# Patient Record
Sex: MALE | Race: BLACK OR AFRICAN AMERICAN | NOT HISPANIC OR LATINO | Employment: UNEMPLOYED | URBAN - METROPOLITAN AREA
[De-identification: names, ages, dates, MRNs, and addresses within clinical notes are randomized per-mention and may not be internally consistent; named-entity substitution may affect disease eponyms.]

---

## 2020-01-23 LAB — HEP C VIRUS AB: NEGATIVE

## 2020-02-03 ENCOUNTER — OFFICE VISIT (OUTPATIENT)
Dept: FAMILY MEDICINE | Facility: CLINIC | Age: 50
End: 2020-02-03
Payer: MEDICAID

## 2020-02-03 VITALS
TEMPERATURE: 98 F | DIASTOLIC BLOOD PRESSURE: 70 MMHG | HEART RATE: 72 BPM | WEIGHT: 242.19 LBS | HEIGHT: 71 IN | SYSTOLIC BLOOD PRESSURE: 118 MMHG | OXYGEN SATURATION: 99 % | BODY MASS INDEX: 33.91 KG/M2 | RESPIRATION RATE: 14 BRPM

## 2020-02-03 DIAGNOSIS — M51.35 DDD (DEGENERATIVE DISC DISEASE), THORACOLUMBAR: ICD-10-CM

## 2020-02-03 DIAGNOSIS — Z86.19 HISTORY OF HEPATITIS C: ICD-10-CM

## 2020-02-03 DIAGNOSIS — Z76.89 ENCOUNTER TO ESTABLISH CARE: Primary | ICD-10-CM

## 2020-02-03 DIAGNOSIS — I10 HYPERTENSION, UNSPECIFIED TYPE: ICD-10-CM

## 2020-02-03 PROCEDURE — 99203 PR OFFICE/OUTPT VISIT, NEW, LEVL III, 30-44 MIN: ICD-10-PCS | Mod: S$GLB,,, | Performed by: FAMILY MEDICINE

## 2020-02-03 PROCEDURE — 99203 OFFICE O/P NEW LOW 30 MIN: CPT | Mod: S$GLB,,, | Performed by: FAMILY MEDICINE

## 2020-02-03 RX ORDER — METOPROLOL SUCCINATE 25 MG/1
25 TABLET, EXTENDED RELEASE ORAL DAILY
COMMUNITY
End: 2020-02-04 | Stop reason: SDUPTHER

## 2020-02-03 RX ORDER — ROSUVASTATIN CALCIUM 40 MG/1
10 TABLET, COATED ORAL NIGHTLY
COMMUNITY
End: 2020-02-04 | Stop reason: SDUPTHER

## 2020-02-03 RX ORDER — CLONAZEPAM 1 MG/1
1 TABLET ORAL DAILY
COMMUNITY
End: 2020-05-06 | Stop reason: SDUPTHER

## 2020-02-03 RX ORDER — CARISOPRODOL 350 MG/1
350 TABLET ORAL 4 TIMES DAILY PRN
COMMUNITY
End: 2020-03-02 | Stop reason: SDUPTHER

## 2020-02-03 RX ORDER — BENZONATATE 100 MG/1
100 CAPSULE ORAL 3 TIMES DAILY PRN
COMMUNITY
End: 2020-03-02

## 2020-02-03 RX ORDER — ISOSORBIDE MONONITRATE 30 MG/1
30 TABLET, EXTENDED RELEASE ORAL DAILY
COMMUNITY
End: 2020-02-04 | Stop reason: SDUPTHER

## 2020-02-03 RX ORDER — PANTOPRAZOLE SODIUM 40 MG/1
40 TABLET, DELAYED RELEASE ORAL DAILY
COMMUNITY
End: 2021-10-13 | Stop reason: SDUPTHER

## 2020-02-03 RX ORDER — AMLODIPINE BESYLATE 5 MG/1
5 TABLET ORAL DAILY
COMMUNITY
End: 2020-02-04 | Stop reason: SDUPTHER

## 2020-02-04 RX ORDER — METOPROLOL SUCCINATE 25 MG/1
25 TABLET, EXTENDED RELEASE ORAL DAILY
Qty: 90 TABLET | Refills: 3 | Status: SHIPPED | OUTPATIENT
Start: 2020-02-04 | End: 2021-01-14 | Stop reason: SDUPTHER

## 2020-02-04 RX ORDER — ROSUVASTATIN CALCIUM 10 MG/1
10 TABLET, COATED ORAL NIGHTLY
Qty: 90 TABLET | Refills: 3 | Status: SHIPPED | OUTPATIENT
Start: 2020-02-04 | End: 2021-01-14 | Stop reason: SDUPTHER

## 2020-02-04 RX ORDER — AMLODIPINE BESYLATE 5 MG/1
5 TABLET ORAL DAILY
Qty: 90 TABLET | Refills: 3 | Status: SHIPPED | OUTPATIENT
Start: 2020-02-04 | End: 2021-01-14 | Stop reason: SDUPTHER

## 2020-02-04 RX ORDER — ISOSORBIDE MONONITRATE 30 MG/1
30 TABLET, EXTENDED RELEASE ORAL DAILY
Qty: 90 TABLET | Refills: 3 | Status: SHIPPED | OUTPATIENT
Start: 2020-02-04 | End: 2021-01-14 | Stop reason: SDUPTHER

## 2020-02-04 NOTE — PROGRESS NOTES
[facility] - Clinic Note    Subjective      Mr. Bales is a 49 y.o. male who presents to clinic for [facility]    Patient presents to [facility].  He has a history of hypertension is currently taking amlodipine, Imdur, metoprolol.  He is followed by Dr. Mendez, cardiologist, because he had intermittent chest pain. He had stress test which was normal.  Denies any history of coronary artery disease or MIs.  He also has a history of hyperlipidemia and is on Crestor.  He is followed by [facility] for anxiety and is currently taking Klonopin.  He has a history of degenerative disc disease and is currently on Soma daily which helps his pain. He reports intermittent numbness and tingling in his upper extremities  He also has a history of hepatitis C that was cured 1 year ago at [facility] in [place].  He goes for 6 month checkups.  He was previously being followed by Dr. Fairchild.    [facility] Vincent has a past medical history of DDD (degenerative disc disease), thoracolumbar, Hyperlipidemia, and Hypertension.   PSX Vincent has no past surgical history on file.    Vincent's family history includes Diabetes in his maternal grandmother; Hypertension in his mother.    Vincent reports that he has never smoked. He has never used smokeless tobacco. He reports that he does not drink alcohol or use drugs.   [facility] Vincent has No Known Allergies.   [facility] Vincent has a current medication list which includes the following prescription(s): amlodipine, benzonatate, carisoprodol, clonazepam, isosorbide mononitrate, metoprolol succinate, pantoprazole, and rosuvastatin.     Review of Systems   Constitutional: Negative for chills and fever.   HENT: Negative for congestion and rhinorrhea.    Eyes: Negative for visual disturbance.   Respiratory: Negative for cough and shortness of breath.    Cardiovascular: Negative for chest pain.   Gastrointestinal: Negative for abdominal pain, constipation, diarrhea, nausea and vomiting.  "  Genitourinary: Negative for dysuria.   Musculoskeletal: Positive for back pain. Negative for myalgias.   Skin: Negative for rash.   Neurological: Negative for weakness and headaches.     Objective     /70 (BP Location: Right arm, Patient Position: Sitting, BP Method: X-Large (Automatic))   Pulse 72   Temp 98.4 °F (36.9 °C) (Oral)   Resp 14   Ht 5' 11" (1.803 m)   Wt 109.9 kg (242 lb 3.2 oz)   SpO2 99%   BMI 33.78 kg/m²     Physical Exam   Constitutional: He appears well-developed and well-nourished. No distress.   HENT:   Right Ear: External ear normal.   Left Ear: External ear normal.   Eyes: Right eye exhibits no discharge. Left eye exhibits no discharge.   Cardiovascular: Normal rate, regular rhythm and normal heart sounds.   Pulmonary/Chest: Effort normal and breath sounds normal. He has no wheezes. He has no rales.   Neurological: He is alert.   Skin: Skin is warm and dry.   Psychiatric: He has a normal mood and affect.   Nursing note and vitals reviewed.     Assessment/Plan     1. Encounter to establish care     2. Hypertension, unspecified type  amLODIPine (NORVASC) 5 MG tablet    isosorbide mononitrate (IMDUR) 30 MG 24 hr tablet    metoprolol succinate (TOPROL-XL) 25 MG 24 hr tablet    rosuvastatin (CRESTOR) 10 MG tablet   3. DDD (degenerative disc disease), thoracolumbar     4. History of hepatitis C       Patient doing well today.  Blood pressure well controlled.  Will continue current medications for now.  Will obtain records from previous physicians for review.    Follow up in about 4 weeks (around 3/2/2020) for Follow up.    Future Appointments   Date Time Provider Department Center   3/2/2020  1:40 PM Kin Domínguez MD [facility]         Kin Domínguez MD  Family Medicine  [facility]          "

## 2020-02-06 DIAGNOSIS — Z12.11 COLON CANCER SCREENING: ICD-10-CM

## 2020-02-17 ENCOUNTER — PATIENT OUTREACH (OUTPATIENT)
Dept: ADMINISTRATIVE | Facility: HOSPITAL | Age: 50
End: 2020-02-17

## 2020-02-17 NOTE — LETTER
"February 17, 2020    Vincent Bales  [address] [place] [address]             [facility]  [address]  [facility] [address]  Phone: [phone] Dear John [facility] is committed to your overall health and would like to ensure that you are up to date on your recommended test and/or procedures.   Kin Domínguez MD  has found that your chart shows you may be due for the following:    Health Maintenance Due   Topic Date Due    Lipid Panel  1970    HIV Screening  12/02/1983    TETANUS VACCINE  12/02/1986    Shingles Vaccine (1 of 2) 12/02/2018    Colonoscopy  12/02/2018    Influenza Vaccine (1) 09/01/2019   If you haven't signed up for a colorectal screening please accept this invitation to be screened.    According to the American Cancer Society, colon cancer is the third most common cancer for people in the [place].  A simple screening test "Fit Kit" - done in your own home - can help find colon cancer at an early stage when it can be treated, even before any signs or symptoms develop. THIS IS A YEARLY TEST.    Testing for blood in your stool (feces or bowel movement) is the first step. If you have an upcoming visit with your Primary Care Physician you can  a Fit Kit during your visit or you can  a Fit Kit at your Primary Care Clinic prior to your visit.    The Fit Kit includes:    · Instructions on how to perform the test  · (1) Sheet of tissue paper  · (1) Small Absorption pad  · (1) Bottle to hold the sample and a small probe to help you take the sample  · (1) Small plastic bio-hazard bag  · (1) Postage-paid return envelope    Please do your test (the instructions will tell you how) and then return your sample in the postage-paid return envelope within 24 hours of collection.     If your test results are negative, you won't need testing again for another year.  If results show you need more testing, we will call you with next steps.    Regular " "colorectal cancer screening is one of the most powerful weapons for preventing colon cancer.  The website https://www.cancer.org/cancer/colon-rectal-cancer.html can answer many of your questions about this cancer and its prevention.  Just search for "colorectal cancer".  If you have any questions please call [facility] TOUCH [phone] for assistance.  If you have had any of the above done at another facility, please let us know so that we may obtain copies from that facility.  If you have a copy of these records, please provide a copy for us to scan into your chart.  You are welcome to request that the report be faxed to us at  ([phone]).     Otherwise, please schedule these appointments at your earliest convenience by calling [phone] or going to [email or web address].    If you have an upcoming scheduled appointment for the item above, please disregard this letter.    Sincerely,  [facility]  MD Patricia Johnson L.P.N. Clinical Care Coordinator  [address] [place] [address], [place] [address] [phone]         "

## 2020-03-02 ENCOUNTER — LAB VISIT (OUTPATIENT)
Dept: LAB | Facility: HOSPITAL | Age: 50
End: 2020-03-02
Attending: FAMILY MEDICINE
Payer: MEDICAID

## 2020-03-02 ENCOUNTER — OFFICE VISIT (OUTPATIENT)
Dept: FAMILY MEDICINE | Facility: CLINIC | Age: 50
End: 2020-03-02
Payer: MEDICAID

## 2020-03-02 VITALS
BODY MASS INDEX: 33.6 KG/M2 | RESPIRATION RATE: 14 BRPM | DIASTOLIC BLOOD PRESSURE: 72 MMHG | HEIGHT: 71 IN | HEART RATE: 77 BPM | TEMPERATURE: 98 F | OXYGEN SATURATION: 96 % | SYSTOLIC BLOOD PRESSURE: 114 MMHG | WEIGHT: 240 LBS

## 2020-03-02 DIAGNOSIS — Z13.220 SCREENING FOR LIPID DISORDERS: ICD-10-CM

## 2020-03-02 DIAGNOSIS — Z11.4 SCREENING FOR HIV (HUMAN IMMUNODEFICIENCY VIRUS): ICD-10-CM

## 2020-03-02 DIAGNOSIS — Z23 NEED FOR TDAP VACCINATION: ICD-10-CM

## 2020-03-02 DIAGNOSIS — M51.35 DDD (DEGENERATIVE DISC DISEASE), THORACOLUMBAR: Primary | ICD-10-CM

## 2020-03-02 DIAGNOSIS — R05.9 COUGH: ICD-10-CM

## 2020-03-02 LAB
CHOLEST SERPL-MCNC: 193 MG/DL (ref 120–199)
CHOLEST/HDLC SERPL: 3.9 {RATIO} (ref 2–5)
HDLC SERPL-MCNC: 49 MG/DL (ref 40–75)
HDLC SERPL: 25.4 % (ref 20–50)
LDLC SERPL CALC-MCNC: 120.6 MG/DL (ref 63–159)
NONHDLC SERPL-MCNC: 144 MG/DL
TRIGL SERPL-MCNC: 117 MG/DL (ref 30–150)

## 2020-03-02 PROCEDURE — 80061 LIPID PANEL: CPT

## 2020-03-02 PROCEDURE — 90471 IMMUNIZATION ADMIN: CPT | Mod: S$GLB,,, | Performed by: FAMILY MEDICINE

## 2020-03-02 PROCEDURE — 99214 PR OFFICE/OUTPT VISIT, EST, LEVL IV, 30-39 MIN: ICD-10-PCS | Mod: 25,S$GLB,, | Performed by: FAMILY MEDICINE

## 2020-03-02 PROCEDURE — 99214 OFFICE O/P EST MOD 30 MIN: CPT | Mod: 25,S$GLB,, | Performed by: FAMILY MEDICINE

## 2020-03-02 PROCEDURE — 36415 COLL VENOUS BLD VENIPUNCTURE: CPT

## 2020-03-02 PROCEDURE — 90471 TDAP VACCINE GREATER THAN OR EQUAL TO 7YO IM: ICD-10-PCS | Mod: S$GLB,,, | Performed by: FAMILY MEDICINE

## 2020-03-02 PROCEDURE — 86703 HIV-1/HIV-2 1 RESULT ANTBDY: CPT

## 2020-03-02 PROCEDURE — 90715 TDAP VACCINE GREATER THAN OR EQUAL TO 7YO IM: ICD-10-PCS | Mod: S$GLB,,, | Performed by: FAMILY MEDICINE

## 2020-03-02 PROCEDURE — 90715 TDAP VACCINE 7 YRS/> IM: CPT | Mod: S$GLB,,, | Performed by: FAMILY MEDICINE

## 2020-03-02 RX ORDER — QUETIAPINE FUMARATE 200 MG/1
TABLET, FILM COATED ORAL
COMMUNITY
Start: 2020-02-04 | End: 2021-09-07 | Stop reason: SDUPTHER

## 2020-03-02 RX ORDER — CARISOPRODOL 350 MG/1
350 TABLET ORAL 2 TIMES DAILY PRN
Qty: 60 TABLET | Refills: 0 | Status: SHIPPED | OUTPATIENT
Start: 2020-03-02 | End: 2020-04-24 | Stop reason: SDUPTHER

## 2020-03-02 RX ORDER — FLUTICASONE PROPIONATE 50 MCG
1 SPRAY, SUSPENSION (ML) NASAL 2 TIMES DAILY
Qty: 16 G | Refills: 0 | Status: SHIPPED | OUTPATIENT
Start: 2020-03-02 | End: 2020-03-27

## 2020-03-02 RX ORDER — BENZONATATE 100 MG/1
200 CAPSULE ORAL 3 TIMES DAILY PRN
Qty: 30 CAPSULE | Refills: 0 | Status: SHIPPED | OUTPATIENT
Start: 2020-03-02 | End: 2020-03-12

## 2020-03-03 LAB — HIV 1+2 AB+HIV1 P24 AG SERPL QL IA: NEGATIVE

## 2020-03-04 NOTE — PROGRESS NOTES
"[facility] - Clinic Note    Subjective      Mr. Bales is a 49 y.o. male who presents to clinic for Follow-up    Patient reports that since our last visit he has been doing well.  His blood pressure has been well controlled.  He is taking Klonopin for anxiety.  He needs a refill on his Soma which helps him with his degenerative disc disease pain. In the future he may be interested in pain management.  Patient also reports a cough that is minimally productive over the last few days.    PM[facility] Vincent has a past medical history of DDD (degenerative disc disease), thoracolumbar, Hyperlipidemia, and Hypertension.   PSXH Vincent has no past surgical history on file.    Vincent's family history includes Diabetes in his maternal grandmother; Hypertension in his mother.   [facility] Vincent reports that he has never smoked. He has never used smokeless tobacco. He reports that he does not drink alcohol or use drugs.   ALG Vincent has No Known Allergies.   ALBERTINA Kaur has a current medication list which includes the following prescription(s): amlodipine, carisoprodol, clonazepam, isosorbide mononitrate, metoprolol succinate, pantoprazole, quetiapine, rosuvastatin, benzonatate, and fluticasone propionate.     Review of Systems   Constitutional: Negative for chills and fever.   Eyes: Negative for visual disturbance.   Cardiovascular: Negative for chest pain.   Musculoskeletal: Positive for back pain and neck pain.   Neurological: Negative for headaches.     Objective     /72 (BP Location: Right arm, Patient Position: Sitting, BP Method: Large (Automatic))   Pulse 77   Temp 98.1 °F (36.7 °C) (Oral)   Resp 14   Ht 5' 11" (1.803 m)   Wt 108.9 kg (240 lb)   SpO2 96%   BMI 33.47 kg/m²     Physical Exam   Constitutional: He appears well-developed and well-nourished. No distress.   HENT:   Right Ear: External ear normal.   Left Ear: External ear normal.   Eyes: Right eye exhibits no discharge. Left eye exhibits no discharge. "   Cardiovascular: Normal rate, regular rhythm and normal heart sounds.   Pulmonary/Chest: Effort normal and breath sounds normal. He has no wheezes. He has no rales.   Neurological: He is alert.   Skin: Skin is warm and dry.   Psychiatric: He has a normal mood and affect.   Nursing note and vitals reviewed.     Assessment/Plan     1. DDD (degenerative disc disease), thoracolumbar  carisoprodoL (SOMA) 350 MG tablet   2. Cough  fluticasone propionate (FLONASE) 50 mcg/actuation nasal spray    benzonatate (TESSALON) 100 MG capsule   3. Screening for HIV (human immunodeficiency virus)  HIV 1/2 Ag/Ab (4th Gen)   4. Screening for lipid disorders  Lipid panel   5. Need for Tdap vaccination  (In Office Administered) Tdap Vaccine     Will continue Soma as it is improving patient's quality of life.   reviewed.  No red flags.  Given cough will prescribed Flonase and Tessalon to use as needed.  Screening lab work as above.    Follow up in about 2 months (around 5/2/2020) for follow up.    Future Appointments   Date Time Provider Department Center   5/5/2020  1:20 PM Kin Domínguez MD [facility]         Kin Domínguez MD  Family Medicine  [facility]

## 2020-03-16 LAB
CHOLEST SERPL-MSCNC: 205 MG/DL (ref 0–200)
HDLC SERPL-MCNC: 48 MG/DL
LDLC SERPL CALC-MCNC: 132 MG/DL
TRIGL SERPL-MCNC: 103 MG/DL

## 2020-03-20 ENCOUNTER — TELEPHONE (OUTPATIENT)
Dept: FAMILY MEDICINE | Facility: CLINIC | Age: 50
End: 2020-03-20

## 2020-03-20 NOTE — TELEPHONE ENCOUNTER
03/24/2020  5:28PM--- Continuity of care?  Please advise. Thank you    03/20/2020 4:55PM unable to reach patient or leave message X1      ----- Message from Cynthia Monreal sent at 3/20/2020  9:24 AM CDT -----  Contact: Miranda from [employer]  Type: Needs Medical Advice    Who Called:  Medicaid Rep  Best Call Back Number: [phone] Ext [phone]  Additional Information: Wants to know if the DR will do a continuity of care for the patient so he can continue the gastro and the cardi Dr please call her to advise asap please.

## 2020-03-25 ENCOUNTER — TELEPHONE (OUTPATIENT)
Dept: SURGERY | Facility: CLINIC | Age: 50
End: 2020-03-25

## 2020-03-25 NOTE — TELEPHONE ENCOUNTER
Attempted to  that left message.  Her voicemail states that she is out of the office at this time and gave a different number and extension to contact. Number given- [phone], ext# [phone]. Contacted this number and extension and left voicemail to contact office for clarification as to what is needed for patient.

## 2020-03-25 NOTE — TELEPHONE ENCOUNTER
LVM for patient to return call concerning message. MD would like to know why patient is requesting Rx.

## 2020-03-25 NOTE — TELEPHONE ENCOUNTER
----- Message from Faraz Robertson sent at 3/25/2020  1:31 PM CDT -----  Type: Needs Medical Advice    Who Called:  Patient    Pharmacy name and phone #:    [facility], [place] - [address]  [address]  [address]  [facility] [address]  Phone: [phone] Fax: [phone]      Best Call Back Number: [phone]  Additional Information: Patient states that he would like a Z pack called in.

## 2020-03-26 ENCOUNTER — TELEPHONE (OUTPATIENT)
Dept: FAMILY MEDICINE | Facility: CLINIC | Age: 50
End: 2020-03-26

## 2020-03-26 DIAGNOSIS — R05.9 COUGH: ICD-10-CM

## 2020-03-26 RX ORDER — AZITHROMYCIN 250 MG/1
TABLET, FILM COATED ORAL
Qty: 6 TABLET | Refills: 0 | Status: SHIPPED | OUTPATIENT
Start: 2020-03-26 | End: 2020-03-31

## 2020-03-26 NOTE — TELEPHONE ENCOUNTER
----- Message from Sheri Melvin LPN sent at 3/26/2020  2:32 PM CDT -----  Contact: pt  Dr Domínguez    I spoke to patient and he stated that he is having sinus drainage that is getting in his chest and causing some congestion. Patient stated that he has not been around anyone, denies fever or SOB. Patient is requesting Rx z-pack. Please advise,    Thanks Dee    ----- Message -----  From: Rosalva Bourgeois  Sent: 3/26/2020   1:01 PM CDT  To: Catrachita [facility] Staff    Type:  Patient Returning Call    Who Called:  pt  Does the patient know what this is regarding?:  Pt needs office to give him a call as soon as possible regarding ZPAck. Pt is having inflammation builfing up in lungs  Best Call Back Number:    Additional Information:  Thank  you

## 2020-03-27 RX ORDER — FLUTICASONE PROPIONATE 50 MCG
SPRAY, SUSPENSION (ML) NASAL
Qty: 16 G | Refills: 0 | Status: SHIPPED | OUTPATIENT
Start: 2020-03-27 | End: 2020-05-06 | Stop reason: SDUPTHER

## 2020-04-02 DIAGNOSIS — R05.9 COUGH: Primary | ICD-10-CM

## 2020-04-02 RX ORDER — BENZONATATE 100 MG/1
200 CAPSULE ORAL 3 TIMES DAILY PRN
Qty: 270 CAPSULE | Refills: 0 | Status: SHIPPED | OUTPATIENT
Start: 2020-04-02 | End: 2020-07-02 | Stop reason: SDUPTHER

## 2020-04-02 NOTE — TELEPHONE ENCOUNTER
----- Message from Vladimir Mireles sent at 4/2/2020  3:05 PM CDT -----  Type: Needs Medical Advice    Who Called:  Self   Symptoms (please be specific): NA  How long has patient had these symptoms:    Pharmacy name and phone #:  [facility]   Best Call Back Number: [phone]  Additional Information: Patient called to give the name of the cough medication -Benzonate Requesting 90 day supply.

## 2020-04-17 ENCOUNTER — TELEPHONE (OUTPATIENT)
Dept: FAMILY MEDICINE | Facility: CLINIC | Age: 50
End: 2020-04-17

## 2020-04-17 NOTE — TELEPHONE ENCOUNTER
----- Message from Faraz Robertson sent at 4/17/2020 10:46 AM CDT -----  Type:  RX Refill Request    Who Called:  Patient  Refill or New Rx:  Refill  RX Name and Strength:  carisoprodoL (SOMA) 350 MG tablet  How is the patient currently taking it? (ex. 1XDay):  Take 1 tablet (350 mg total) by mouth 2 (two) times daily as needed for Muscle spasms.  Is this a 30 day or 90 day RX:  30  Preferred Pharmacy with phone number:    [facility], [place] - [address]  [address]  [facility] [address]  [place] [address]  Phone: [phone] Fax: [phone]      Local or Mail Order:  Local  Ordering Provider:  Catrachita  Best Call Back Number:  [phone]  Additional Information:

## 2020-04-24 DIAGNOSIS — M51.35 DDD (DEGENERATIVE DISC DISEASE), THORACOLUMBAR: ICD-10-CM

## 2020-04-24 RX ORDER — CARISOPRODOL 350 MG/1
350 TABLET ORAL 2 TIMES DAILY PRN
Qty: 60 TABLET | Refills: 0 | Status: SHIPPED | OUTPATIENT
Start: 2020-04-24 | End: 2020-07-02 | Stop reason: SDUPTHER

## 2020-04-24 RX ORDER — CLONAZEPAM 1 MG/1
1 TABLET ORAL DAILY
Status: CANCELLED | OUTPATIENT
Start: 2020-04-24

## 2020-04-24 NOTE — TELEPHONE ENCOUNTER
----- Message from Faraz Robertson sent at 4/24/2020  9:32 AM CDT -----  Type: Needs Medical Advice  Who Called:  Patient    Pharmacy name and phone #:    [facility] [place], [place] - [address]  [address]  [address]  [facility] [address]  Phone: [phone] Fax: [phone]      Best Call Back Number: [phone]  Additional Information: Patient states that he had sent a request about a week ago for his refills with no response:  carisoprodoL (SOMA) 350 MG tablet   clonazePAM (KLONOPIN) 1 MG tablet     Please call to advise

## 2020-04-24 NOTE — TELEPHONE ENCOUNTER
I sent in the soma; the klonopin was being filled by another doctor - the next refill is not due until 5/2/20. If he is still seeing that provider then he should request refills from there. If not let me know.

## 2020-05-05 ENCOUNTER — PATIENT OUTREACH (OUTPATIENT)
Dept: ADMINISTRATIVE | Facility: HOSPITAL | Age: 50
End: 2020-05-05

## 2020-05-05 ENCOUNTER — PATIENT MESSAGE (OUTPATIENT)
Dept: ADMINISTRATIVE | Facility: HOSPITAL | Age: 50
End: 2020-05-05

## 2020-05-05 ENCOUNTER — TELEPHONE (OUTPATIENT)
Dept: FAMILY MEDICINE | Facility: CLINIC | Age: 50
End: 2020-05-05

## 2020-05-05 RX ORDER — CLONAZEPAM 1 MG/1
1 TABLET ORAL DAILY
OUTPATIENT
Start: 2020-05-05

## 2020-05-05 NOTE — TELEPHONE ENCOUNTER
Called patient, no answer. Left voicemail. Patient needs to be seen if he wants a klonopin script. Patient missed appointment. Needs to reschedule.

## 2020-05-05 NOTE — PROGRESS NOTES
Pre-visit Chart review notification  05/05/20  EFAX SENT TO [facility] FOR COLONOSCOPY RESULTS

## 2020-05-05 NOTE — LETTER
FAX      AUTHORIZATION FOR RELEASE OF   CONFIDENTIAL INFORMATION            [facility]      We are seeing Vincent Bales, date of birth 1970, in the clinic at [facility]. Kin Domínguez MD is the patient's PCP. Vincent Bales has an outstanding lab/procedure at the time we reviewed her chart. In order to help keep her health information updated, she has authorized us to request the following medical record(s):        (  )  MAMMOGRAM                                      ( X )  COLONOSCOPY      (  )  PAP SMEAR                                          (  )  MOST RECENT LAB RESULTS     (  )  DEXA SCAN                                          (  )  DIABETIC EYE EXAM            (  )  DIABETIC FOOT EXAM                        (  )  MOST RECENT A1c, LIPID, &          URINE MICRO-ALBUMIN     (  )  OUTSIDE IMMUNIZATIONS                 (  )  _______________        Please fax records to [facility]  [phone]     If you have any questions, please contact Patricia at [phone].      Patricia Brewer L.P.N. Clinical Care Coordinator  [address] [place] [address] [phone]

## 2020-05-05 NOTE — TELEPHONE ENCOUNTER
----- Message from Magalys Bradley sent at 5/5/2020  3:48 PM CDT -----  Contact: Patient  Type: Needs Medical Advice    Who Called:  Patient    Best Call Back Number: [phone]    Additional Information: pt has questions regarding medication that was supposed to be sent in today? Requesting a call back

## 2020-05-06 ENCOUNTER — DOCUMENTATION ONLY (OUTPATIENT)
Dept: FAMILY MEDICINE | Facility: CLINIC | Age: 50
End: 2020-05-06

## 2020-05-06 ENCOUNTER — OFFICE VISIT (OUTPATIENT)
Dept: FAMILY MEDICINE | Facility: CLINIC | Age: 50
End: 2020-05-06
Payer: MEDICAID

## 2020-05-06 DIAGNOSIS — R05.9 COUGH: ICD-10-CM

## 2020-05-06 DIAGNOSIS — F41.9 ANXIETY: Primary | ICD-10-CM

## 2020-05-06 PROCEDURE — 99442 PR PHYSICIAN TELEPHONE EVALUATION 11-20 MIN: CPT | Mod: GT,,, | Performed by: FAMILY MEDICINE

## 2020-05-06 PROCEDURE — 99442 PR PHYSICIAN TELEPHONE EVALUATION 11-20 MIN: ICD-10-PCS | Mod: GT,,, | Performed by: FAMILY MEDICINE

## 2020-05-06 RX ORDER — FLUTICASONE PROPIONATE 50 MCG
1 SPRAY, SUSPENSION (ML) NASAL 2 TIMES DAILY
Qty: 16 G | Refills: 1 | Status: SHIPPED | OUTPATIENT
Start: 2020-05-06 | End: 2020-10-07 | Stop reason: SDUPTHER

## 2020-05-06 RX ORDER — CLONAZEPAM 1 MG/1
1 TABLET ORAL DAILY
Qty: 30 TABLET | Refills: 1 | Status: SHIPPED | OUTPATIENT
Start: 2020-05-06 | End: 2020-07-02 | Stop reason: SDUPTHER

## 2020-05-06 NOTE — PROGRESS NOTES
[facility] - Telephone Note    The patient location is: home  The chief complaint leading to consultation is:  Follow-up  Visit type: Virtual visit with synchronous audio  Total time spent with patient:  15 min  Each patient to whom he or she provides medical services by telemedicine is:  (1) informed of the relationship between the physician and patient and the respective role of any other health care provider with respect to management of the patient; and (2) notified that he or she may decline to receive medical services by telemedicine and may withdraw from such care at any time.    Notes: Phlegm in the cough for the last 3 days. No fever.  Some postnasal drip.  Has not been using Flonase like suggested.  Patient also wants me to take over his mental health treatment.  Was previously taking Klonopin 1 mg daily which has been helping with his anxiety and helping prevent panic attacks.    Plan:  Will continue Klonopin 1 mg daily.   reviewed.  No red flags.  Lab work next visit as well as urine drug screen.    Follow up: 2 months.      Kin Domínguez MD  Family Medicine  [facility]              
Abdominal Pain, N/V/D

## 2020-05-06 NOTE — PROGRESS NOTES
"Your fax has been successfully sent to [phone] at [phone].  ------------------------------------------------------------  From: [id number]  ------------------------------------------------------------  5/6/2020 4:06:52 PM Transmission Record          Sent to [phone] with remote ID "[facility]"          Result: (0/339;0/0) Success          Page record: 1 - 5          Elapsed time: 02:49 on channel 35    "

## 2020-05-14 ENCOUNTER — PATIENT OUTREACH (OUTPATIENT)
Dept: ADMINISTRATIVE | Facility: HOSPITAL | Age: 50
End: 2020-05-14

## 2020-05-20 ENCOUNTER — PATIENT MESSAGE (OUTPATIENT)
Dept: ADMINISTRATIVE | Facility: HOSPITAL | Age: 50
End: 2020-05-20

## 2020-06-18 DIAGNOSIS — I10 HYPERTENSION, UNSPECIFIED TYPE: ICD-10-CM

## 2020-07-02 DIAGNOSIS — R05.9 COUGH: ICD-10-CM

## 2020-07-02 DIAGNOSIS — F41.9 ANXIETY: ICD-10-CM

## 2020-07-02 DIAGNOSIS — M51.35 DDD (DEGENERATIVE DISC DISEASE), THORACOLUMBAR: ICD-10-CM

## 2020-07-02 RX ORDER — CLONAZEPAM 1 MG/1
1 TABLET ORAL DAILY
Qty: 30 TABLET | Refills: 0 | Status: SHIPPED | OUTPATIENT
Start: 2020-07-02 | End: 2020-07-31 | Stop reason: SDUPTHER

## 2020-07-02 RX ORDER — BENZONATATE 100 MG/1
200 CAPSULE ORAL 3 TIMES DAILY PRN
Qty: 270 CAPSULE | Refills: 0 | Status: SHIPPED | OUTPATIENT
Start: 2020-07-02 | End: 2020-09-01 | Stop reason: SDUPTHER

## 2020-07-02 RX ORDER — CARISOPRODOL 350 MG/1
350 TABLET ORAL 2 TIMES DAILY PRN
Qty: 60 TABLET | Refills: 0 | Status: SHIPPED | OUTPATIENT
Start: 2020-07-02 | End: 2020-08-06 | Stop reason: SDUPTHER

## 2020-07-02 NOTE — TELEPHONE ENCOUNTER
----- Message from Rg Wilson sent at 7/2/2020  9:19 AM CDT -----  Regarding: pt  Type:  RX Refill Request    Who Called:  pt  Refill or New Rx:  refill  RX Name and Strength:    1. carisoprodoL (SOMA) 350 MG tablet 60 tablet 0 4/24/2020        Sig - Route: Take 1 tablet (350 mg total) by mouth 2 (two) times daily as needed for Muscle spasms. - Oral     2. benzonatate (TESSALON) 100 MG capsule 270 capsule 0 4/2/2020 7/1/2020      Sig - Route: Take 2 capsules (200 mg total) by mouth 3 (three) times daily as needed for Cough. - Oral     3. clonazePAM (KLONOPIN) 1 MG tablet 30 tablet 1 5/6/2020        Sig - Route: Take 1 tablet (1 mg total) by mouth once daily. - Oral     How is the patient currently taking it? (ex. 1XDay):  see above   Is this a 30 day or 90 day RX: 30/90  Preferred Pharmacy with phone number:    [facility], [place] - [address]  [address] [address]  Phone: [phone] Fax: [phone]    Local or Mail Order:  local  Ordering Provider:  florence  Best Call Back Number:  [phone]  Additional Information:

## 2020-07-06 ENCOUNTER — OFFICE VISIT (OUTPATIENT)
Dept: FAMILY MEDICINE | Facility: CLINIC | Age: 50
End: 2020-07-06
Payer: MEDICAID

## 2020-07-06 VITALS
DIASTOLIC BLOOD PRESSURE: 75 MMHG | OXYGEN SATURATION: 97 % | BODY MASS INDEX: 31.7 KG/M2 | WEIGHT: 239.19 LBS | HEART RATE: 91 BPM | HEIGHT: 73 IN | SYSTOLIC BLOOD PRESSURE: 125 MMHG | TEMPERATURE: 98 F | RESPIRATION RATE: 15 BRPM

## 2020-07-06 DIAGNOSIS — I10 HYPERTENSION, UNSPECIFIED TYPE: Primary | ICD-10-CM

## 2020-07-06 DIAGNOSIS — F41.9 ANXIETY: ICD-10-CM

## 2020-07-06 DIAGNOSIS — M51.35 DDD (DEGENERATIVE DISC DISEASE), THORACOLUMBAR: ICD-10-CM

## 2020-07-06 PROCEDURE — 99214 PR OFFICE/OUTPT VISIT, EST, LEVL IV, 30-39 MIN: ICD-10-PCS | Mod: S$GLB,,, | Performed by: FAMILY MEDICINE

## 2020-07-06 PROCEDURE — 99214 OFFICE O/P EST MOD 30 MIN: CPT | Mod: S$GLB,,, | Performed by: FAMILY MEDICINE

## 2020-07-06 PROCEDURE — 80307 DRUG TEST PRSMV CHEM ANLYZR: CPT

## 2020-07-06 NOTE — PROGRESS NOTES
"[facility] - Clinic Note    Subjective      Mr. Bales is a 49 y.o. male who presents to clinic for Follow-up (requesting TETANUS)    Patient reports that he has been doing well.  The medication for anxiety Klonopin has been working well for his symptoms.  He has a history of anxiety and panic attacks.  He also has a history of degenerative disc disease and takes Soma which controls his symptoms.  Blood pressure has been well controlled.  Denies any chest pain, blurry vision, headaches.  He is inquiring about getting another tetanus shot but he had a tetanus shot 3 months ago.    [facility] Vincent has a past medical history of DDD (degenerative disc disease), thoracolumbar, Hyperlipidemia, and Hypertension.   PSXH Vincent has no past surgical history on file.    Vincent's family history includes Diabetes in his maternal grandmother; Hypertension in his mother.    Vincent reports that he has never smoked. He has never used smokeless tobacco. He reports that he does not drink alcohol or use drugs.   ALG Vincent has No Known Allergies.   MED Vincent has a current medication list which includes the following prescription(s): amlodipine, benzonatate, carisoprodol, clonazepam, fluticasone propionate, isosorbide mononitrate, metoprolol succinate, pantoprazole, quetiapine, and rosuvastatin.     Review of Systems   Constitutional: Negative for chills and fever.   Eyes: Negative for visual disturbance.   Cardiovascular: Negative for chest pain.   Neurological: Negative for headaches.     Objective     /75 (BP Location: Left arm, Patient Position: Sitting, BP Method: Large (Automatic))   Pulse 91   Temp 98.4 °F (36.9 °C) (Temporal)   Resp 15   Ht 6' 1" (1.854 m)   Wt 108.5 kg (239 lb 3.2 oz)   SpO2 97%   BMI 31.56 kg/m²     Physical Exam  Vitals signs and nursing note reviewed.   Constitutional:       General: He is not in acute distress.     Appearance: Normal appearance. He is well-developed. He is not diaphoretic.   HENT:    "   Head: Normocephalic and atraumatic.      Right Ear: External ear normal.      Left Ear: External ear normal.   Eyes:      General:         Right eye: No discharge.         Left eye: No discharge.   Cardiovascular:      Rate and Rhythm: Normal rate and regular rhythm.      Heart sounds: Normal heart sounds.   Pulmonary:      Effort: Pulmonary effort is normal.      Breath sounds: Normal breath sounds. No wheezing or rales.   Skin:     General: Skin is warm and dry.   Neurological:      Mental Status: He is alert and oriented to person, place, and time. Mental status is at baseline.   Psychiatric:         Mood and Affect: Mood normal.         Behavior: Behavior normal.         Thought Content: Thought content normal.         Judgment: Judgment normal.        Assessment/Plan     1. Hypertension, unspecified type  Comprehensive metabolic panel    CBC auto differential   2. Anxiety  Comprehensive metabolic panel    CBC auto differential    [facility] Drug Screen   3. DDD (degenerative disc disease), thoracolumbar       Will continue current medications as prescribed.  Due for lab work as above.  Due for drug screen.   reviewed.  No red flags.  Blood pressure is well controlled.    Future Appointments   Date Time Provider Department Center   7/9/2020  9:20 AM [facility]ATORY [facility]   10/7/2020  9:20 AM [facility] Dom[facility] MD [facility] Dom[facility] MD  Family Medicine  [facility]

## 2020-07-09 ENCOUNTER — LAB VISIT (OUTPATIENT)
Dept: LAB | Facility: HOSPITAL | Age: 50
End: 2020-07-09
Attending: FAMILY MEDICINE
Payer: MEDICAID

## 2020-07-09 ENCOUNTER — OFFICE VISIT (OUTPATIENT)
Dept: FAMILY MEDICINE | Facility: CLINIC | Age: 50
End: 2020-07-09
Payer: MEDICAID

## 2020-07-09 VITALS
SYSTOLIC BLOOD PRESSURE: 106 MMHG | OXYGEN SATURATION: 97 % | HEART RATE: 76 BPM | TEMPERATURE: 97 F | WEIGHT: 239 LBS | DIASTOLIC BLOOD PRESSURE: 69 MMHG | HEIGHT: 73 IN | BODY MASS INDEX: 31.68 KG/M2 | RESPIRATION RATE: 15 BRPM

## 2020-07-09 DIAGNOSIS — F41.9 ANXIETY: ICD-10-CM

## 2020-07-09 DIAGNOSIS — I10 HYPERTENSION, UNSPECIFIED TYPE: ICD-10-CM

## 2020-07-09 DIAGNOSIS — S41.111A LACERATION OF MULTIPLE SITES OF RIGHT UPPER EXTREMITY, INITIAL ENCOUNTER: Primary | ICD-10-CM

## 2020-07-09 LAB
ALBUMIN SERPL BCP-MCNC: 4.3 G/DL (ref 3.5–5.2)
ALP SERPL-CCNC: 55 U/L (ref 55–135)
ALT SERPL W/O P-5'-P-CCNC: 39 U/L (ref 10–44)
ANION GAP SERPL CALC-SCNC: 10 MMOL/L (ref 8–16)
AST SERPL-CCNC: 29 U/L (ref 10–40)
BASOPHILS # BLD AUTO: 0.04 K/UL (ref 0–0.2)
BASOPHILS NFR BLD: 0.7 % (ref 0–1.9)
BILIRUB SERPL-MCNC: 0.8 MG/DL (ref 0.1–1)
BUN SERPL-MCNC: 9 MG/DL (ref 6–20)
CALCIUM SERPL-MCNC: 9.1 MG/DL (ref 8.7–10.5)
CHLORIDE SERPL-SCNC: 103 MMOL/L (ref 95–110)
CO2 SERPL-SCNC: 24 MMOL/L (ref 23–29)
CREAT SERPL-MCNC: 0.8 MG/DL (ref 0.5–1.4)
DIFFERENTIAL METHOD: ABNORMAL
EOSINOPHIL # BLD AUTO: 0.1 K/UL (ref 0–0.5)
EOSINOPHIL NFR BLD: 1.5 % (ref 0–8)
ERYTHROCYTE [DISTWIDTH] IN BLOOD BY AUTOMATED COUNT: 13.1 % (ref 11.5–14.5)
EST. GFR  (AFRICAN AMERICAN): >60 ML/MIN/1.73 M^2
EST. GFR  (NON AFRICAN AMERICAN): >60 ML/MIN/1.73 M^2
GLUCOSE SERPL-MCNC: 108 MG/DL (ref 70–110)
HCT VFR BLD AUTO: 41.9 % (ref 40–54)
HGB BLD-MCNC: 14 G/DL (ref 14–18)
IMM GRANULOCYTES # BLD AUTO: 0.01 K/UL (ref 0–0.04)
IMM GRANULOCYTES NFR BLD AUTO: 0.2 % (ref 0–0.5)
LYMPHOCYTES # BLD AUTO: 2.6 K/UL (ref 1–4.8)
LYMPHOCYTES NFR BLD: 45.3 % (ref 18–48)
MCH RBC QN AUTO: 30.7 PG (ref 27–31)
MCHC RBC AUTO-ENTMCNC: 33.4 G/DL (ref 32–36)
MCV RBC AUTO: 92 FL (ref 82–98)
MONOCYTES # BLD AUTO: 0.5 K/UL (ref 0.3–1)
MONOCYTES NFR BLD: 8.6 % (ref 4–15)
NEUTROPHILS # BLD AUTO: 2.6 K/UL (ref 1.8–7.7)
NEUTROPHILS NFR BLD: 43.7 % (ref 38–73)
NRBC BLD-RTO: 0 /100 WBC
PLATELET # BLD AUTO: 160 K/UL (ref 150–350)
PMV BLD AUTO: 10.5 FL (ref 9.2–12.9)
POTASSIUM SERPL-SCNC: 4 MMOL/L (ref 3.5–5.1)
PROT SERPL-MCNC: 7.6 G/DL (ref 6–8.4)
RBC # BLD AUTO: 4.56 M/UL (ref 4.6–6.2)
SODIUM SERPL-SCNC: 137 MMOL/L (ref 136–145)
WBC # BLD AUTO: 5.83 K/UL (ref 3.9–12.7)

## 2020-07-09 PROCEDURE — 80053 COMPREHEN METABOLIC PANEL: CPT

## 2020-07-09 PROCEDURE — 90714 TD VACC NO PRESV 7 YRS+ IM: CPT | Mod: S$GLB,,, | Performed by: FAMILY MEDICINE

## 2020-07-09 PROCEDURE — 36415 COLL VENOUS BLD VENIPUNCTURE: CPT

## 2020-07-09 PROCEDURE — 90471 TD VACCINE GREATER THAN OR EQUAL TO 7YO WITH PRESERVATIVE IM: ICD-10-PCS | Mod: S$GLB,,, | Performed by: FAMILY MEDICINE

## 2020-07-09 PROCEDURE — 90714 TD VACCINE GREATER THAN OR EQUAL TO 7YO WITH PRESERVATIVE IM: ICD-10-PCS | Mod: S$GLB,,, | Performed by: FAMILY MEDICINE

## 2020-07-09 PROCEDURE — 90471 IMMUNIZATION ADMIN: CPT | Mod: S$GLB,,, | Performed by: FAMILY MEDICINE

## 2020-07-09 PROCEDURE — 99214 OFFICE O/P EST MOD 30 MIN: CPT | Mod: 25,S$GLB,, | Performed by: FAMILY MEDICINE

## 2020-07-09 PROCEDURE — 85025 COMPLETE CBC W/AUTO DIFF WBC: CPT

## 2020-07-09 PROCEDURE — 99214 PR OFFICE/OUTPT VISIT, EST, LEVL IV, 30-39 MIN: ICD-10-PCS | Mod: 25,S$GLB,, | Performed by: FAMILY MEDICINE

## 2020-07-09 NOTE — PROGRESS NOTES
"[facility] - Clinic Note    Subjective      Mr. Bales is a 49 y.o. male who presents to clinic for Laceration (R finger and forearm, requesting Tetanus)    Patient reports that yesterday he cut his right forearm and right index finger on a Metal sheet.  He is worried about tetanus.  He clean the wounds with hydrogen peroxide.  There are slightly painful.  There has been no drainage from these wounds.  He has not had any fever.    PMH Vincent has a past medical history of DDD (degenerative disc disease), thoracolumbar, Hyperlipidemia, and Hypertension.   PSXH Vincent has no past surgical history on file.    Vincent's family history includes Diabetes in his maternal grandmother; Hypertension in his mother.   SH Vincent reports that he has never smoked. He has never used smokeless tobacco. He reports that he does not drink alcohol or use drugs.   ALG Vincent has No Known Allergies.   ALBERTINA Kaur has a current medication list which includes the following prescription(s): amlodipine, benzonatate, carisoprodol, clonazepam, fluticasone propionate, isosorbide mononitrate, metoprolol succinate, pantoprazole, quetiapine, and rosuvastatin.     Review of Systems   Constitutional: Negative for chills and fever.   Respiratory: Negative for shortness of breath.    Cardiovascular: Negative for chest pain.   Skin: Positive for wound.     Objective     /69 (BP Location: Left arm, Patient Position: Sitting, BP Method: Large (Automatic))   Pulse 76   Temp 97 °F (36.1 °C) (Temporal)   Resp 15   Ht 6' 1" (1.854 m)   Wt 108.4 kg (239 lb)   SpO2 97%   BMI 31.53 kg/m²     Physical Exam  Vitals signs and nursing note reviewed.   Constitutional:       General: He is not in acute distress.     Appearance: Normal appearance. He is well-developed. He is not diaphoretic.   HENT:      Head: Normocephalic and atraumatic.      Right Ear: External ear normal.      Left Ear: External ear normal.   Eyes:      General:         Right eye: No " discharge.         Left eye: No discharge.   Cardiovascular:      Rate and Rhythm: Normal rate and regular rhythm.      Heart sounds: Normal heart sounds.   Pulmonary:      Effort: Pulmonary effort is normal.      Breath sounds: Normal breath sounds. No wheezing or rales.   Skin:     General: Skin is warm and dry.          Neurological:      Mental Status: He is alert and oriented to person, place, and time. Mental status is at baseline.   Psychiatric:         Mood and Affect: Mood normal.         Behavior: Behavior normal.         Thought Content: Thought content normal.         Judgment: Judgment normal.        Assessment/Plan     1. Laceration of multiple sites of right upper extremity, initial encounter  (In Office Administered) Td Vaccine     Lacerations cleaned and dressed with triple antibiotic ointment.  Given laceration with unknown medical will provide TD vaccination.    Future Appointments   Date Time Provider Department Center   10/7/2020  9:20 AM Kin Domínguez MD [facility]         Kin Domínguez MD  Family Medicine  [facility]

## 2020-07-10 LAB
6MAM UR QL: NOT DETECTED
7AMINOCLONAZEPAM UR QL: PRESENT
A-OH ALPRAZ UR QL: NOT DETECTED
ALPRAZ UR QL: NOT DETECTED
AMPHET UR QL SCN: NOT DETECTED
ANNOTATION COMMENT IMP: NORMAL
ANNOTATION COMMENT IMP: NORMAL
BARBITURATES UR QL: NOT DETECTED
BUPRENORPHINE UR QL: NOT DETECTED
BZE UR QL: NOT DETECTED
CARBOXYTHC UR QL: NOT DETECTED
CARISOPRODOL UR QL: PRESENT
CLONAZEPAM UR QL: NOT DETECTED
CODEINE UR QL: NOT DETECTED
CREAT UR-MCNC: 128.3 MG/DL (ref 20–400)
DIAZEPAM UR QL: NOT DETECTED
ETHYL GLUCURONIDE UR QL: NOT DETECTED
FENTANYL UR QL: NOT DETECTED
HYDROCODONE UR QL: NOT DETECTED
HYDROMORPHONE UR QL: NOT DETECTED
LORAZEPAM UR QL: NOT DETECTED
MDA UR QL: NOT DETECTED
MDEA UR QL: NOT DETECTED
MDMA UR QL: NOT DETECTED
ME-PHENIDATE UR QL: NOT DETECTED
MEPERIDINE UR QL: NOT DETECTED
METHADONE UR QL: NOT DETECTED
METHAMPHET UR QL: NOT DETECTED
MIDAZOLAM UR QL SCN: NOT DETECTED
MORPHINE UR QL: NOT DETECTED
NORBUPRENORPHINE UR QL CFM: NOT DETECTED
NORDIAZEPAM UR QL: NOT DETECTED
NORFENTANYL UR QL: NOT DETECTED
NORHYDROCODONE UR QL CFM: NOT DETECTED
NOROXYCODONE UR QL CFM: NOT DETECTED
NOROXYMORPHONE: NOT DETECTED
OXAZEPAM UR QL: NOT DETECTED
OXYCODONE UR QL: NOT DETECTED
OXYMORPHONE UR QL: NOT DETECTED
PATHOLOGY STUDY: NORMAL
PCP UR QL: NOT DETECTED
PHENTERMINE UR QL: NOT DETECTED
PROPOXYPH UR QL: NOT DETECTED
SERVICE CMNT-IMP: NORMAL
TAPENTADOL UR QL SCN: NOT DETECTED
TAPENTADOL-O-SULF: NOT DETECTED
TEMAZEPAM UR QL: NOT DETECTED
TRAMADOL UR QL: NOT DETECTED
ZOLPIDEM UR QL: NOT DETECTED

## 2020-07-31 DIAGNOSIS — F41.9 ANXIETY: ICD-10-CM

## 2020-07-31 RX ORDER — CLONAZEPAM 1 MG/1
1 TABLET ORAL DAILY
Qty: 30 TABLET | Refills: 0 | Status: SHIPPED | OUTPATIENT
Start: 2020-07-31 | End: 2020-09-01 | Stop reason: SDUPTHER

## 2020-07-31 NOTE — TELEPHONE ENCOUNTER
----- Message from Massiel Brunson sent at 7/31/2020  2:28 PM CDT -----  Contact: pt  Pt states that he has 1 pill left on his clonazePAM (KLONOPIN) 1 MG tablet, 60 day supply ,[phone] (home)           .  [facility] - [place], [place] - [address]  [address]  [facility] [address]  [place] [address]  Phone: [phone] Fax: [phone]

## 2020-08-06 DIAGNOSIS — M51.35 DDD (DEGENERATIVE DISC DISEASE), THORACOLUMBAR: ICD-10-CM

## 2020-08-06 RX ORDER — CARISOPRODOL 350 MG/1
350 TABLET ORAL 2 TIMES DAILY PRN
Qty: 60 TABLET | Refills: 0 | Status: SHIPPED | OUTPATIENT
Start: 2020-08-06 | End: 2020-09-24 | Stop reason: SDUPTHER

## 2020-08-06 NOTE — TELEPHONE ENCOUNTER
----- Message from Rg Wilson sent at 8/6/2020  9:47 AM CDT -----  Regarding: pt  Type:  RX Refill Request    Who Called:  pt  Refill or New Rx:  refill  RX Name and Strength:  carisoprodoL (SOMA) 350 MG tablet  How is the patient currently taking it? (ex. 1XDay):  Sig - Route: Take 1 tablet (350 mg total) by mouth 2 (two) times daily as needed for Muscle spasms. - Oral   Is this a 30 day or 90 day RX:  30  Preferred Pharmacy with phone number:    [facility], [place] - [address]  [address]  [facility] [address]  [place] [address]  Phone: [phone] Fax: [phone]    Local or Mail Order:  local  Ordering Provider:    Best Call Back Number:  [phone]   Additional Information:  pt is out of med

## 2020-08-07 ENCOUNTER — TELEPHONE (OUTPATIENT)
Dept: FAMILY MEDICINE | Facility: CLINIC | Age: 50
End: 2020-08-07

## 2020-08-07 NOTE — TELEPHONE ENCOUNTER
----- Message from Massiel Brunson sent at 8/7/2020 11:44 AM CDT -----  Contact: pt  Pt would like a call concerning his results,pt states that he did some labs 7/9 and his liver specialist in [place] did an [place] Which the Dr should have gotten too. Please send to his mychart or call him..[phone] (home)

## 2020-08-11 ENCOUNTER — TELEPHONE (OUTPATIENT)
Dept: FAMILY MEDICINE | Facility: CLINIC | Age: 50
End: 2020-08-11

## 2020-08-11 NOTE — TELEPHONE ENCOUNTER
Based on what I saw in the notes everything looks pretty stable.  Make sure to keep your follow-up with the liver specialist.

## 2020-08-11 NOTE — TELEPHONE ENCOUNTER
----- Message from Maricruz Boyle MA sent at 8/11/2020 11:11 AM CDT -----  Type:  Patient Returning Call    Who Called:  Vincent Saleem Left Message for Patient:  unknown  Does the patient know what this is regarding?:  test results  Best Call Back Number:  [phone]  Additional Information:

## 2020-08-11 NOTE — TELEPHONE ENCOUNTER
If he is having pain in his abdomen we can place an order for referral to general surgery for evaluation of the gallbladder stone.  His last colonoscopy was in 2015 and it was negative.  He would not need another 1 until 2025.

## 2020-09-01 DIAGNOSIS — R05.9 COUGH: ICD-10-CM

## 2020-09-01 DIAGNOSIS — F41.9 ANXIETY: ICD-10-CM

## 2020-09-01 RX ORDER — CLONAZEPAM 1 MG/1
1 TABLET ORAL DAILY
Qty: 30 TABLET | Refills: 0 | Status: SHIPPED | OUTPATIENT
Start: 2020-09-01 | End: 2020-10-01

## 2020-09-01 RX ORDER — BENZONATATE 100 MG/1
200 CAPSULE ORAL 3 TIMES DAILY PRN
Qty: 270 CAPSULE | Refills: 0 | Status: SHIPPED | OUTPATIENT
Start: 2020-09-01 | End: 2020-10-07 | Stop reason: SDUPTHER

## 2020-09-24 DIAGNOSIS — M51.35 DDD (DEGENERATIVE DISC DISEASE), THORACOLUMBAR: ICD-10-CM

## 2020-09-24 RX ORDER — CARISOPRODOL 350 MG/1
350 TABLET ORAL 2 TIMES DAILY PRN
Qty: 60 TABLET | Refills: 0 | Status: SHIPPED | OUTPATIENT
Start: 2020-09-30 | End: 2020-11-03 | Stop reason: SDUPTHER

## 2020-10-01 DIAGNOSIS — F41.9 ANXIETY: ICD-10-CM

## 2020-10-01 RX ORDER — CLONAZEPAM 1 MG/1
1 TABLET ORAL DAILY
Qty: 30 TABLET | Refills: 2 | Status: SHIPPED | OUTPATIENT
Start: 2020-10-01 | End: 2020-11-02 | Stop reason: SDUPTHER

## 2020-10-01 NOTE — TELEPHONE ENCOUNTER
----- Message from Kiki Fuchs sent at 10/1/2020  1:44 PM CDT -----  Contact: call  pt [phone]    Type:  RX Refill Request    Who Called:  pt  Refill RX Name and Strength:    klonopin  1  mg  How is the patient currently taking it? (ex. 1XDay):   1/2   3  times a day  Is this a 30 day or 90 day RX:  30 days  Preferred Pharmacy with phone number:    [facility], [place] - [address]  [address]  [address] [address]  Phone: [phone] Fax: [phone]  Best Call Back Number:  call  pt [phone]  Additional Information:    pt is  out  of  med // please  fill asap

## 2020-10-07 ENCOUNTER — OFFICE VISIT (OUTPATIENT)
Dept: FAMILY MEDICINE | Facility: CLINIC | Age: 50
End: 2020-10-07
Payer: MEDICAID

## 2020-10-07 VITALS
HEART RATE: 77 BPM | BODY MASS INDEX: 31.41 KG/M2 | SYSTOLIC BLOOD PRESSURE: 127 MMHG | HEIGHT: 73 IN | DIASTOLIC BLOOD PRESSURE: 87 MMHG | RESPIRATION RATE: 14 BRPM | TEMPERATURE: 98 F | WEIGHT: 237 LBS | OXYGEN SATURATION: 98 %

## 2020-10-07 DIAGNOSIS — M51.35 DDD (DEGENERATIVE DISC DISEASE), THORACOLUMBAR: Primary | ICD-10-CM

## 2020-10-07 DIAGNOSIS — R05.9 COUGH: ICD-10-CM

## 2020-10-07 PROCEDURE — 99214 PR OFFICE/OUTPT VISIT, EST, LEVL IV, 30-39 MIN: ICD-10-PCS | Mod: S$GLB,,, | Performed by: FAMILY MEDICINE

## 2020-10-07 PROCEDURE — 99214 OFFICE O/P EST MOD 30 MIN: CPT | Mod: S$GLB,,, | Performed by: FAMILY MEDICINE

## 2020-10-07 RX ORDER — FLUTICASONE PROPIONATE 50 MCG
1 SPRAY, SUSPENSION (ML) NASAL 2 TIMES DAILY
Qty: 16 G | Refills: 1 | Status: SHIPPED | OUTPATIENT
Start: 2020-10-07 | End: 2020-12-11 | Stop reason: SDUPTHER

## 2020-10-07 RX ORDER — BENZONATATE 100 MG/1
200 CAPSULE ORAL 3 TIMES DAILY PRN
Qty: 270 CAPSULE | Refills: 0 | Status: SHIPPED | OUTPATIENT
Start: 2020-10-07 | End: 2021-01-05

## 2020-10-07 RX ORDER — MELOXICAM 15 MG/1
15 TABLET ORAL DAILY PRN
Qty: 90 TABLET | Refills: 1 | Status: SHIPPED | OUTPATIENT
Start: 2020-10-07 | End: 2020-11-04 | Stop reason: SDUPTHER

## 2020-10-07 NOTE — PROGRESS NOTES
"[facility] - Clinic Note    Subjective      Mr. Bales is a 49 y.o. male who presents to clinic for Follow-up (requesting refills)    With regard to patient's degenerative disc disease it is stable.  The Soma is helping.  He takes it when he needs it.  Sometimes at night he has increased pain in his back he has not taken anti-inflammatory medicine.  Blood pressure is well controlled.  The Klonopin is helping with his anxiety.    PMH Vincent has a past medical history of DDD (degenerative disc disease), thoracolumbar, Hyperlipidemia, and Hypertension.   PSXH Vincent has no past surgical history on file.    Vincent's family history includes Diabetes in his maternal grandmother; Hypertension in his mother.   SH Vincent reports that he has never smoked. He has never used smokeless tobacco. He reports that he does not drink alcohol or use drugs.   ALG Vincent has No Known Allergies.   MED Vincent has a current medication list which includes the following prescription(s): amlodipine, benzonatate, carisoprodol, clonazepam, fluticasone propionate, isosorbide mononitrate, metoprolol succinate, pantoprazole, quetiapine, rosuvastatin, and meloxicam.     Review of Systems   Constitutional: Negative for chills and fever.   Eyes: Negative for visual disturbance.   Respiratory: Negative for shortness of breath.    Cardiovascular: Negative for chest pain.     Objective     /87 (BP Location: Right arm, Patient Position: Sitting, BP Method: Large (Automatic))   Pulse 77   Temp 97.5 °F (36.4 °C) (Temporal)   Resp 14   Ht 6' 1" (1.854 m)   Wt 107.5 kg (237 lb)   SpO2 98%   BMI 31.27 kg/m²     Physical Exam  Vitals signs and nursing note reviewed.   Constitutional:       General: He is not in acute distress.     Appearance: Normal appearance. He is well-developed. He is not diaphoretic.   HENT:      Head: Normocephalic and atraumatic.      Right Ear: External ear normal.      Left Ear: External ear normal.   Eyes:      General:       "   Right eye: No discharge.         Left eye: No discharge.   Cardiovascular:      Rate and Rhythm: Normal rate and regular rhythm.      Heart sounds: Normal heart sounds.   Pulmonary:      Effort: Pulmonary effort is normal.      Breath sounds: Normal breath sounds. No wheezing or rales.   Skin:     General: Skin is warm and dry.   Neurological:      Mental Status: He is alert and oriented to person, place, and time. Mental status is at baseline.   Psychiatric:         Mood and Affect: Mood normal.         Behavior: Behavior normal.         Thought Content: Thought content normal.         Judgment: Judgment normal.        Assessment/Plan     1. DDD (degenerative disc disease), thoracolumbar  meloxicam (MOBIC) 15 MG tablet   2. Cough  benzonatate (TESSALON) 100 MG capsule    fluticasone propionate (FLONASE) 50 mcg/actuation nasal spray     Will add meloxicam to his regimen.  Refills of medications provided.  Follow-up in 3 months.    Future Appointments   Date Time Provider Department Center   1/14/2021  9:20 AM Kin Domínguez MD [facility]         Kin Domínguez MD  Family Medicine  [facility]

## 2020-11-02 DIAGNOSIS — M51.35 DDD (DEGENERATIVE DISC DISEASE), THORACOLUMBAR: ICD-10-CM

## 2020-11-02 DIAGNOSIS — F41.9 ANXIETY: ICD-10-CM

## 2020-11-02 NOTE — TELEPHONE ENCOUNTER
Chart Reviewed for Duplicate Request:yes    RX Requested:klonopin    Rx Strength:1 m9    Refill or New:refill    30 day or 90 day:30 day    [facility]    Last Office Visit / Provider:10/07/2020 [place]    Next Office Visit / Provider:01/14/2021 [place]    Additional Information:n/a

## 2020-11-03 RX ORDER — CLONAZEPAM 1 MG/1
1 TABLET ORAL DAILY
Qty: 30 TABLET | Refills: 0 | Status: SHIPPED | OUTPATIENT
Start: 2020-11-03 | End: 2021-01-29 | Stop reason: SDUPTHER

## 2020-11-03 RX ORDER — CARISOPRODOL 350 MG/1
350 TABLET ORAL 2 TIMES DAILY PRN
Qty: 60 TABLET | Refills: 0 | Status: SHIPPED | OUTPATIENT
Start: 2020-11-03 | End: 2020-12-17 | Stop reason: SDUPTHER

## 2020-11-03 NOTE — TELEPHONE ENCOUNTER
Chart Reviewed for Duplicate Request: [place]    RX Requested:Soma    Rx Strength:350    Refill or New:refill  30 day or 90 day:30 day    Preferred Pharmacy:[facility]    Last Office Visit / Provider:10/07/2020 [place]    Next Office Visit / Provider:01/14/2021    Additional Information:[place]

## 2020-11-03 NOTE — TELEPHONE ENCOUNTER
----- Message from Faraz Robertson sent at 11/3/2020  1:01 PM CST -----  Type: Needs Medical Advice  Who Called:  Patient    Pharmacy name and phone #:    [facility] - [place], [place] - [address]  [address]  [place] [address]  [facility] [address]  Phone: [phone] Fax: [phone]      Best Call Back Number:[phone]  Additional Information:Patient states that his refill isn't at the pharmacy:  carisoprodoL (SOMA) 350 MG tablet    Please call to advise

## 2020-11-04 DIAGNOSIS — M51.35 DDD (DEGENERATIVE DISC DISEASE), THORACOLUMBAR: ICD-10-CM

## 2020-11-04 RX ORDER — MELOXICAM 15 MG/1
15 TABLET ORAL DAILY PRN
Qty: 90 TABLET | Refills: 1 | Status: SHIPPED | OUTPATIENT
Start: 2020-11-04 | End: 2021-07-13 | Stop reason: SDUPTHER

## 2020-11-04 NOTE — TELEPHONE ENCOUNTER
Chart Reviewed for Duplicate Request: Yes    RX Requested:Mobic    Rx Strength:15 mg    Refill or New:refill    30 day or 90 day:30 day    Preferred Pharmacy [facility]    Last Office Visit / Provider:10/07/2020 [place]    Next Office Visit / Provider:01/14/2020 [place]    Additional Information:na

## 2020-12-11 DIAGNOSIS — R05.9 COUGH: ICD-10-CM

## 2020-12-11 NOTE — TELEPHONE ENCOUNTER
----- Message from Juliane Tovar sent at 12/11/2020  2:33 PM CST -----  Contact: patient  Type:  RX Refill Request    Who Called:  patient  Refill or New Rx:  Refill  RX Name and Strength:  fluticasone propionate (FLONASE) 50 mcg/actuation nasal spray  How is the patient currently taking it? (ex. 1XDay):  na  Is this a 30 day or 90 day RX:  gloria  Preferred Pharmacy with phone number:    [facility], [place] - [address]  [address] [address]  Phone: [phone] Fax: [phone]  Local or Mail Order:  local  Ordering Provider:  bao Valdivia Call Back Number:  [phone]  Additional Information:  [facility]

## 2020-12-14 ENCOUNTER — TELEPHONE (OUTPATIENT)
Dept: FAMILY MEDICINE | Facility: CLINIC | Age: 50
End: 2020-12-14

## 2020-12-14 RX ORDER — FLUTICASONE PROPIONATE 50 MCG
1 SPRAY, SUSPENSION (ML) NASAL 2 TIMES DAILY
Qty: 16 G | Refills: 1 | Status: SHIPPED | OUTPATIENT
Start: 2020-12-14 | End: 2021-03-29 | Stop reason: SDUPTHER

## 2020-12-14 NOTE — TELEPHONE ENCOUNTER
----- Message from Kristin Dick MA sent at 12/11/2020  5:41 PM CST -----  Contact: patient    ----- Message -----  From: Juliane Tovar  Sent: 12/11/2020   2:31 PM CST  To: [facility] Staff    Type: Needs Medical Advice  Who Called:  patient  Symptoms (please be specific):  na  How long has patient had these symptoms:  gloria  Pharmacy name and phone #:  [facility]  Best Call Back Number: [phone]  Additional Information: patient states he would like to speak to nurse about his antibiotic medication.  Thanks!

## 2020-12-14 NOTE — TELEPHONE ENCOUNTER
----- Message from Cynthia Monreal sent at 12/14/2020  2:55 PM CST -----  Type: Needs Medical Advice  Who Called: patient  Pharmacy name and phone #:  [facility] Call Back Number: [phone] (home)   Additional Information: the pt would like a call back for an antibiotic he had a светлана nail go into his finger he needs Clindamycin please call him to advise. Thanks.

## 2020-12-14 NOTE — TELEPHONE ENCOUNTER
Spoke with patient, informed him that since his finger had no pain, fever, or discharge, he would need to watch for infection. Patient states that he has had tetanus. Denies redness, pain or warm area on hand. Patient will continue to monitor.

## 2020-12-17 DIAGNOSIS — M51.35 DDD (DEGENERATIVE DISC DISEASE), THORACOLUMBAR: ICD-10-CM

## 2020-12-17 RX ORDER — CARISOPRODOL 350 MG/1
350 TABLET ORAL 2 TIMES DAILY PRN
Qty: 60 TABLET | Refills: 0 | Status: SHIPPED | OUTPATIENT
Start: 2020-12-17 | End: 2021-01-14 | Stop reason: SDUPTHER

## 2020-12-17 NOTE — TELEPHONE ENCOUNTER
----- Message from Sarah Gaona MA sent at 12/17/2020 10:58 AM CST -----  Regarding: refill  Contact: sofya  Type:  RX Refill Request    Who Called:  patient   Refill or New Rx:  refill  RX Name and Strength: carisoprodoL (SOMA) 350 MG tablet      How is the patient currently taking it? (ex. 1XDay):    Is this a 30 day or 90 day RX:    Preferred Pharmacy with phone number:    [facility] [place], [place] - [address]  [address] [address]  Phone: [phone] Fax: [phone]      Local or Mail Order:    Ordering Provider:  Cat[facility]   Best Call Back Number:  [phone] (home)     Additional Information:

## 2021-01-14 ENCOUNTER — OFFICE VISIT (OUTPATIENT)
Dept: FAMILY MEDICINE | Facility: CLINIC | Age: 51
End: 2021-01-14
Payer: MEDICAID

## 2021-01-14 VITALS
OXYGEN SATURATION: 98 % | RESPIRATION RATE: 12 BRPM | HEIGHT: 73 IN | WEIGHT: 232.81 LBS | SYSTOLIC BLOOD PRESSURE: 120 MMHG | HEART RATE: 79 BPM | TEMPERATURE: 97 F | DIASTOLIC BLOOD PRESSURE: 72 MMHG | BODY MASS INDEX: 30.85 KG/M2

## 2021-01-14 DIAGNOSIS — F41.9 ANXIETY: ICD-10-CM

## 2021-01-14 DIAGNOSIS — I10 ESSENTIAL HYPERTENSION: ICD-10-CM

## 2021-01-14 DIAGNOSIS — M51.35 DDD (DEGENERATIVE DISC DISEASE), THORACOLUMBAR: Primary | ICD-10-CM

## 2021-01-14 PROCEDURE — 99214 PR OFFICE/OUTPT VISIT, EST, LEVL IV, 30-39 MIN: ICD-10-PCS | Mod: S$GLB,,, | Performed by: FAMILY MEDICINE

## 2021-01-14 PROCEDURE — 99214 OFFICE O/P EST MOD 30 MIN: CPT | Mod: S$GLB,,, | Performed by: FAMILY MEDICINE

## 2021-01-14 RX ORDER — METOPROLOL SUCCINATE 25 MG/1
25 TABLET, EXTENDED RELEASE ORAL DAILY
Qty: 90 TABLET | Refills: 3 | Status: SHIPPED | OUTPATIENT
Start: 2021-01-14 | End: 2023-02-27 | Stop reason: SDUPTHER

## 2021-01-14 RX ORDER — CLINDAMYCIN HYDROCHLORIDE 150 MG/1
150 CAPSULE ORAL EVERY 8 HOURS
Qty: 21 CAPSULE | Refills: 0 | Status: SHIPPED | OUTPATIENT
Start: 2021-01-14 | End: 2021-01-14

## 2021-01-14 RX ORDER — CITALOPRAM 20 MG/1
TABLET, FILM COATED ORAL
COMMUNITY
Start: 2020-12-14

## 2021-01-14 RX ORDER — CLINDAMYCIN HYDROCHLORIDE 150 MG/1
150 CAPSULE ORAL EVERY 8 HOURS
Qty: 21 CAPSULE | Refills: 0 | OUTPATIENT
Start: 2021-01-14 | End: 2021-01-14

## 2021-01-14 RX ORDER — CLINDAMYCIN HYDROCHLORIDE 150 MG/1
150 CAPSULE ORAL EVERY 8 HOURS
Qty: 21 CAPSULE | Refills: 0 | Status: SHIPPED | OUTPATIENT
Start: 2021-01-14 | End: 2021-01-21

## 2021-01-14 RX ORDER — ISOSORBIDE MONONITRATE 30 MG/1
30 TABLET, EXTENDED RELEASE ORAL DAILY
Qty: 90 TABLET | Refills: 3 | Status: SHIPPED | OUTPATIENT
Start: 2021-01-14 | End: 2022-01-27

## 2021-01-14 RX ORDER — AMLODIPINE BESYLATE 5 MG/1
5 TABLET ORAL DAILY
Qty: 90 TABLET | Refills: 3 | Status: SHIPPED | OUTPATIENT
Start: 2021-01-14

## 2021-01-14 RX ORDER — EZETIMIBE 10 MG/1
TABLET ORAL
COMMUNITY
Start: 2020-12-10

## 2021-01-14 RX ORDER — CARISOPRODOL 350 MG/1
350 TABLET ORAL 2 TIMES DAILY PRN
Qty: 60 TABLET | Refills: 0 | Status: SHIPPED | OUTPATIENT
Start: 2021-01-14 | End: 2021-03-16 | Stop reason: SDUPTHER

## 2021-01-14 RX ORDER — ROSUVASTATIN CALCIUM 10 MG/1
10 TABLET, COATED ORAL NIGHTLY
Qty: 90 TABLET | Refills: 3 | Status: SHIPPED | OUTPATIENT
Start: 2021-01-14 | End: 2023-02-27 | Stop reason: SDUPTHER

## 2021-01-29 DIAGNOSIS — F41.9 ANXIETY: ICD-10-CM

## 2021-01-29 RX ORDER — CLONAZEPAM 1 MG/1
1 TABLET ORAL DAILY
Qty: 30 TABLET | Refills: 0 | Status: SHIPPED | OUTPATIENT
Start: 2021-01-29 | End: 2021-03-05 | Stop reason: SDUPTHER

## 2021-03-05 DIAGNOSIS — F41.9 ANXIETY: ICD-10-CM

## 2021-03-06 RX ORDER — CLONAZEPAM 1 MG/1
1 TABLET ORAL DAILY
Qty: 30 TABLET | Refills: 0 | Status: SHIPPED | OUTPATIENT
Start: 2021-03-06 | End: 2021-03-29 | Stop reason: SDUPTHER

## 2021-03-16 DIAGNOSIS — M51.35 DDD (DEGENERATIVE DISC DISEASE), THORACOLUMBAR: ICD-10-CM

## 2021-03-17 RX ORDER — CARISOPRODOL 350 MG/1
350 TABLET ORAL 2 TIMES DAILY PRN
Qty: 60 TABLET | Refills: 0 | Status: SHIPPED | OUTPATIENT
Start: 2021-03-17 | End: 2021-04-12 | Stop reason: SDUPTHER

## 2021-03-29 DIAGNOSIS — F41.9 ANXIETY: ICD-10-CM

## 2021-03-29 DIAGNOSIS — R05.9 COUGH: ICD-10-CM

## 2021-03-29 RX ORDER — FLUTICASONE PROPIONATE 50 MCG
1 SPRAY, SUSPENSION (ML) NASAL 2 TIMES DAILY
Qty: 16 G | Refills: 1 | Status: SHIPPED | OUTPATIENT
Start: 2021-03-29 | End: 2021-09-07 | Stop reason: SDUPTHER

## 2021-03-29 RX ORDER — CLONAZEPAM 1 MG/1
1 TABLET ORAL DAILY
Qty: 30 TABLET | Refills: 0 | Status: SHIPPED | OUTPATIENT
Start: 2021-04-02 | End: 2021-04-12 | Stop reason: SDUPTHER

## 2021-04-12 ENCOUNTER — OFFICE VISIT (OUTPATIENT)
Dept: FAMILY MEDICINE | Facility: CLINIC | Age: 51
End: 2021-04-12
Payer: MEDICAID

## 2021-04-12 VITALS
WEIGHT: 231.63 LBS | TEMPERATURE: 97 F | DIASTOLIC BLOOD PRESSURE: 72 MMHG | SYSTOLIC BLOOD PRESSURE: 105 MMHG | HEIGHT: 73 IN | RESPIRATION RATE: 12 BRPM | BODY MASS INDEX: 30.7 KG/M2 | HEART RATE: 78 BPM | OXYGEN SATURATION: 98 %

## 2021-04-12 DIAGNOSIS — F41.9 ANXIETY: Primary | ICD-10-CM

## 2021-04-12 DIAGNOSIS — M51.35 DDD (DEGENERATIVE DISC DISEASE), THORACOLUMBAR: ICD-10-CM

## 2021-04-12 PROCEDURE — 99214 PR OFFICE/OUTPT VISIT, EST, LEVL IV, 30-39 MIN: ICD-10-PCS | Mod: S$GLB,,, | Performed by: FAMILY MEDICINE

## 2021-04-12 PROCEDURE — 99214 OFFICE O/P EST MOD 30 MIN: CPT | Mod: S$GLB,,, | Performed by: FAMILY MEDICINE

## 2021-04-12 RX ORDER — CLONAZEPAM 1 MG/1
1 TABLET ORAL 2 TIMES DAILY PRN
Qty: 60 TABLET | Refills: 0 | Status: SHIPPED | OUTPATIENT
Start: 2021-04-12 | End: 2021-05-28 | Stop reason: SDUPTHER

## 2021-04-12 RX ORDER — CARISOPRODOL 350 MG/1
350 TABLET ORAL 2 TIMES DAILY PRN
Qty: 60 TABLET | Refills: 0 | Status: SHIPPED | OUTPATIENT
Start: 2021-04-12 | End: 2021-05-29 | Stop reason: SDUPTHER

## 2021-04-13 ENCOUNTER — PATIENT OUTREACH (OUTPATIENT)
Dept: ADMINISTRATIVE | Facility: HOSPITAL | Age: 51
End: 2021-04-13

## 2021-05-28 DIAGNOSIS — F41.9 ANXIETY: ICD-10-CM

## 2021-05-28 DIAGNOSIS — M51.35 DDD (DEGENERATIVE DISC DISEASE), THORACOLUMBAR: ICD-10-CM

## 2021-05-29 RX ORDER — CLONAZEPAM 1 MG/1
1 TABLET ORAL 2 TIMES DAILY PRN
Qty: 60 TABLET | Refills: 0 | Status: SHIPPED | OUTPATIENT
Start: 2021-05-29 | End: 2021-07-13 | Stop reason: SDUPTHER

## 2021-05-29 RX ORDER — QUETIAPINE FUMARATE 200 MG/1
TABLET, FILM COATED ORAL
Status: CANCELLED | OUTPATIENT
Start: 2021-05-29

## 2021-05-29 RX ORDER — CARISOPRODOL 350 MG/1
350 TABLET ORAL 2 TIMES DAILY PRN
Qty: 60 TABLET | Refills: 0 | Status: SHIPPED | OUTPATIENT
Start: 2021-05-29 | End: 2021-07-13 | Stop reason: SDUPTHER

## 2021-06-03 DIAGNOSIS — M51.35 DDD (DEGENERATIVE DISC DISEASE), THORACOLUMBAR: ICD-10-CM

## 2021-06-03 DIAGNOSIS — F41.9 ANXIETY: ICD-10-CM

## 2021-06-03 RX ORDER — CARISOPRODOL 350 MG/1
350 TABLET ORAL 2 TIMES DAILY PRN
Qty: 60 TABLET | Refills: 0 | Status: CANCELLED | OUTPATIENT
Start: 2021-06-03

## 2021-06-03 RX ORDER — CLONAZEPAM 1 MG/1
1 TABLET ORAL 2 TIMES DAILY PRN
Qty: 60 TABLET | Refills: 0 | Status: CANCELLED | OUTPATIENT
Start: 2021-06-03

## 2021-07-13 ENCOUNTER — OFFICE VISIT (OUTPATIENT)
Dept: FAMILY MEDICINE | Facility: CLINIC | Age: 51
End: 2021-07-13
Payer: MEDICAID

## 2021-07-13 VITALS
DIASTOLIC BLOOD PRESSURE: 80 MMHG | SYSTOLIC BLOOD PRESSURE: 124 MMHG | HEIGHT: 73 IN | RESPIRATION RATE: 14 BRPM | OXYGEN SATURATION: 97 % | BODY MASS INDEX: 31.2 KG/M2 | WEIGHT: 235.38 LBS | HEART RATE: 75 BPM

## 2021-07-13 DIAGNOSIS — F41.9 ANXIETY: Primary | ICD-10-CM

## 2021-07-13 DIAGNOSIS — M51.35 DDD (DEGENERATIVE DISC DISEASE), THORACOLUMBAR: ICD-10-CM

## 2021-07-13 PROCEDURE — 99214 PR OFFICE/OUTPT VISIT, EST, LEVL IV, 30-39 MIN: ICD-10-PCS | Mod: S$GLB,,, | Performed by: FAMILY MEDICINE

## 2021-07-13 PROCEDURE — 80307 DRUG TEST PRSMV CHEM ANLYZR: CPT | Performed by: FAMILY MEDICINE

## 2021-07-13 PROCEDURE — 99214 OFFICE O/P EST MOD 30 MIN: CPT | Mod: S$GLB,,, | Performed by: FAMILY MEDICINE

## 2021-07-13 RX ORDER — AMOXICILLIN 500 MG/1
500 CAPSULE ORAL 3 TIMES DAILY
COMMUNITY
Start: 2021-07-08 | End: 2021-07-13

## 2021-07-13 RX ORDER — CLONAZEPAM 1 MG/1
1 TABLET ORAL 2 TIMES DAILY PRN
Qty: 60 TABLET | Refills: 0 | Status: SHIPPED | OUTPATIENT
Start: 2021-07-13 | End: 2021-09-07 | Stop reason: SDUPTHER

## 2021-07-13 RX ORDER — CARISOPRODOL 350 MG/1
350 TABLET ORAL 2 TIMES DAILY PRN
Qty: 60 TABLET | Refills: 0 | Status: SHIPPED | OUTPATIENT
Start: 2021-07-13 | End: 2021-09-07 | Stop reason: SDUPTHER

## 2021-07-13 RX ORDER — MELOXICAM 15 MG/1
15 TABLET ORAL DAILY PRN
Qty: 90 TABLET | Refills: 1 | Status: SHIPPED | OUTPATIENT
Start: 2021-07-13 | End: 2021-09-07 | Stop reason: SDUPTHER

## 2021-07-17 LAB
6MAM UR QL: NOT DETECTED
7AMINOCLONAZEPAM UR QL: PRESENT
A-OH ALPRAZ UR QL: NOT DETECTED
ALPHA-OH-MIDAZOLAM: NOT DETECTED
ALPRAZ UR QL: NOT DETECTED
AMPHET UR QL SCN: NOT DETECTED
ANNOTATION COMMENT IMP: NORMAL
ANNOTATION COMMENT IMP: NORMAL
BARBITURATES UR QL: NOT DETECTED
BUPRENORPHINE UR QL: NOT DETECTED
BZE UR QL: NOT DETECTED
CARBOXYTHC UR QL: NOT DETECTED
CARISOPRODOL UR QL: PRESENT
CLONAZEPAM UR QL: NOT DETECTED
CODEINE UR QL: NOT DETECTED
CREAT UR-MCNC: 213.2 MG/DL (ref 20–400)
DIAZEPAM UR QL: NOT DETECTED
ETHYL GLUCURONIDE UR QL: NOT DETECTED
FENTANYL UR QL: NOT DETECTED
GABAPENTIN: NOT DETECTED
HYDROCODONE UR QL: NOT DETECTED
HYDROMORPHONE UR QL: NOT DETECTED
LORAZEPAM UR QL: NOT DETECTED
MDA UR QL: NOT DETECTED
MDEA UR QL: NOT DETECTED
MDMA UR QL: NOT DETECTED
ME-PHENIDATE UR QL: NOT DETECTED
METHADONE UR QL: NOT DETECTED
METHAMPHET UR QL: NOT DETECTED
MIDAZOLAM UR QL SCN: NOT DETECTED
MORPHINE UR QL: NOT DETECTED
NALOXONE: NOT DETECTED
NORBUPRENORPHINE UR QL CFM: NOT DETECTED
NORDIAZEPAM UR QL: NOT DETECTED
NORFENTANYL UR QL: NOT DETECTED
NORHYDROCODONE UR QL CFM: NOT DETECTED
NORMEPERIDINE UR QL CFM: NOT DETECTED
NOROXYCODONE UR QL CFM: NOT DETECTED
NOROXYMORPHONE UR QL SCN: NOT DETECTED
OXAZEPAM UR QL: NOT DETECTED
OXYCODONE UR QL: NOT DETECTED
OXYMORPHONE UR QL: NOT DETECTED
PATHOLOGY STUDY: NORMAL
PCP UR QL: NOT DETECTED
PHENTERMINE UR QL: NOT DETECTED
PREGABALIN: NOT DETECTED
SERVICE CMNT-IMP: NORMAL
TAPENTADOL UR QL SCN: NOT DETECTED
TAPENTADOL-O-SULF: NOT DETECTED
TEMAZEPAM UR QL: NOT DETECTED
TRAMADOL UR QL: NOT DETECTED
ZOLPIDEM METABOLITE: NOT DETECTED
ZOLPIDEM UR QL: NOT DETECTED

## 2021-08-25 ENCOUNTER — TELEPHONE (OUTPATIENT)
Dept: FAMILY MEDICINE | Facility: CLINIC | Age: 51
End: 2021-08-25

## 2021-08-25 DIAGNOSIS — K04.7 DENTAL INFECTION: Primary | ICD-10-CM

## 2021-08-25 RX ORDER — AMOXICILLIN AND CLAVULANATE POTASSIUM 875; 125 MG/1; MG/1
1 TABLET, FILM COATED ORAL EVERY 12 HOURS
Qty: 20 TABLET | Refills: 0 | Status: SHIPPED | OUTPATIENT
Start: 2021-08-25 | End: 2021-09-04

## 2021-09-07 DIAGNOSIS — R05.9 COUGH: ICD-10-CM

## 2021-09-07 DIAGNOSIS — F41.9 ANXIETY: ICD-10-CM

## 2021-09-07 DIAGNOSIS — M51.35 DDD (DEGENERATIVE DISC DISEASE), THORACOLUMBAR: ICD-10-CM

## 2021-09-07 RX ORDER — CARISOPRODOL 350 MG/1
350 TABLET ORAL 2 TIMES DAILY PRN
Qty: 60 TABLET | Refills: 0 | Status: SHIPPED | OUTPATIENT
Start: 2021-09-07 | End: 2021-10-13 | Stop reason: SDUPTHER

## 2021-09-07 RX ORDER — MELOXICAM 15 MG/1
15 TABLET ORAL DAILY PRN
Qty: 90 TABLET | Refills: 1 | Status: SHIPPED | OUTPATIENT
Start: 2021-09-07 | End: 2021-10-13

## 2021-09-07 RX ORDER — CLONAZEPAM 1 MG/1
1 TABLET ORAL 2 TIMES DAILY PRN
Qty: 60 TABLET | Refills: 0 | Status: SHIPPED | OUTPATIENT
Start: 2021-09-07 | End: 2021-10-11

## 2021-09-07 RX ORDER — QUETIAPINE FUMARATE 200 MG/1
200 TABLET, FILM COATED ORAL NIGHTLY
Qty: 90 TABLET | Refills: 1 | Status: SHIPPED | OUTPATIENT
Start: 2021-09-07 | End: 2023-02-27 | Stop reason: SDUPTHER

## 2021-09-07 RX ORDER — FLUTICASONE PROPIONATE 50 MCG
1 SPRAY, SUSPENSION (ML) NASAL 2 TIMES DAILY
Qty: 16 G | Refills: 1 | Status: SHIPPED | OUTPATIENT
Start: 2021-09-07 | End: 2022-05-13

## 2021-10-13 ENCOUNTER — OFFICE VISIT (OUTPATIENT)
Dept: FAMILY MEDICINE | Facility: CLINIC | Age: 51
End: 2021-10-13
Payer: MEDICAID

## 2021-10-13 VITALS
SYSTOLIC BLOOD PRESSURE: 124 MMHG | HEART RATE: 73 BPM | OXYGEN SATURATION: 97 % | TEMPERATURE: 98 F | HEIGHT: 73 IN | BODY MASS INDEX: 31.1 KG/M2 | WEIGHT: 234.63 LBS | RESPIRATION RATE: 12 BRPM | DIASTOLIC BLOOD PRESSURE: 68 MMHG

## 2021-10-13 DIAGNOSIS — K21.9 GASTROESOPHAGEAL REFLUX DISEASE, UNSPECIFIED WHETHER ESOPHAGITIS PRESENT: Primary | ICD-10-CM

## 2021-10-13 DIAGNOSIS — M51.35 DDD (DEGENERATIVE DISC DISEASE), THORACOLUMBAR: ICD-10-CM

## 2021-10-13 PROCEDURE — 90686 IIV4 VACC NO PRSV 0.5 ML IM: CPT | Mod: PBBFAC

## 2021-10-13 PROCEDURE — 99213 OFFICE O/P EST LOW 20 MIN: CPT | Mod: PBBFAC,25 | Performed by: FAMILY MEDICINE

## 2021-10-13 PROCEDURE — 99999 PR PBB SHADOW E&M-EST. PATIENT-LVL III: ICD-10-PCS | Mod: PBBFAC,,, | Performed by: FAMILY MEDICINE

## 2021-10-13 PROCEDURE — 99214 PR OFFICE/OUTPT VISIT, EST, LEVL IV, 30-39 MIN: ICD-10-PCS | Mod: S$PBB,,, | Performed by: FAMILY MEDICINE

## 2021-10-13 PROCEDURE — 99999 PR PBB SHADOW E&M-EST. PATIENT-LVL III: CPT | Mod: PBBFAC,,, | Performed by: FAMILY MEDICINE

## 2021-10-13 PROCEDURE — 99214 OFFICE O/P EST MOD 30 MIN: CPT | Mod: S$PBB,,, | Performed by: FAMILY MEDICINE

## 2021-10-13 RX ORDER — CARISOPRODOL 350 MG/1
350 TABLET ORAL 2 TIMES DAILY PRN
Qty: 60 TABLET | Refills: 0 | Status: SHIPPED | OUTPATIENT
Start: 2021-10-13 | End: 2021-11-29 | Stop reason: SDUPTHER

## 2021-10-13 RX ORDER — DICLOFENAC SODIUM 75 MG/1
75 TABLET, DELAYED RELEASE ORAL 2 TIMES DAILY PRN
Qty: 60 TABLET | Refills: 3 | Status: SHIPPED | OUTPATIENT
Start: 2021-10-13 | End: 2022-01-13

## 2021-10-13 RX ORDER — PANTOPRAZOLE SODIUM 40 MG/1
40 TABLET, DELAYED RELEASE ORAL DAILY
Qty: 90 TABLET | Refills: 3 | Status: SHIPPED | OUTPATIENT
Start: 2021-10-13 | End: 2023-11-14

## 2021-10-13 RX ORDER — BENZONATATE 100 MG/1
200 CAPSULE ORAL 3 TIMES DAILY PRN
COMMUNITY
Start: 2021-07-13 | End: 2021-11-01

## 2021-11-29 DIAGNOSIS — M51.35 DDD (DEGENERATIVE DISC DISEASE), THORACOLUMBAR: ICD-10-CM

## 2021-11-29 DIAGNOSIS — K04.7 DENTAL INFECTION: Primary | ICD-10-CM

## 2021-11-30 RX ORDER — AMOXICILLIN 500 MG/1
500 TABLET, FILM COATED ORAL EVERY 12 HOURS
Qty: 20 TABLET | Refills: 0 | Status: SHIPPED | OUTPATIENT
Start: 2021-11-30 | End: 2021-12-10

## 2021-11-30 RX ORDER — CARISOPRODOL 350 MG/1
350 TABLET ORAL 2 TIMES DAILY PRN
Qty: 60 TABLET | Refills: 0 | Status: SHIPPED | OUTPATIENT
Start: 2021-11-30 | End: 2021-12-20

## 2022-01-13 ENCOUNTER — LAB VISIT (OUTPATIENT)
Dept: FAMILY MEDICINE | Facility: CLINIC | Age: 52
End: 2022-01-13
Payer: MEDICAID

## 2022-01-13 ENCOUNTER — OFFICE VISIT (OUTPATIENT)
Dept: FAMILY MEDICINE | Facility: CLINIC | Age: 52
End: 2022-01-13
Payer: MEDICAID

## 2022-01-13 ENCOUNTER — LAB VISIT (OUTPATIENT)
Dept: LAB | Facility: HOSPITAL | Age: 52
End: 2022-01-13
Attending: FAMILY MEDICINE
Payer: MEDICAID

## 2022-01-13 VITALS
OXYGEN SATURATION: 97 % | SYSTOLIC BLOOD PRESSURE: 122 MMHG | HEIGHT: 73 IN | BODY MASS INDEX: 31.39 KG/M2 | DIASTOLIC BLOOD PRESSURE: 76 MMHG | TEMPERATURE: 98 F | WEIGHT: 236.81 LBS | HEART RATE: 78 BPM | RESPIRATION RATE: 14 BRPM

## 2022-01-13 DIAGNOSIS — K04.7 DENTAL INFECTION: ICD-10-CM

## 2022-01-13 DIAGNOSIS — Z20.822 EXPOSURE TO COVID-19 VIRUS: ICD-10-CM

## 2022-01-13 DIAGNOSIS — F41.9 ANXIETY: ICD-10-CM

## 2022-01-13 DIAGNOSIS — M51.35 DDD (DEGENERATIVE DISC DISEASE), THORACOLUMBAR: Primary | ICD-10-CM

## 2022-01-13 DIAGNOSIS — Z20.822 CONTACT WITH AND (SUSPECTED) EXPOSURE TO COVID-19: Primary | ICD-10-CM

## 2022-01-13 LAB
SARS-COV-2 IGG SERPL IA-ACNC: 1796.4 AU/ML
SARS-COV-2 IGG SERPL QL IA: POSITIVE
SARS-COV-2 RNA RESP QL NAA+PROBE: NOT DETECTED
SARS-COV-2- CYCLE NUMBER: NORMAL

## 2022-01-13 PROCEDURE — 99214 PR OFFICE/OUTPT VISIT, EST, LEVL IV, 30-39 MIN: ICD-10-PCS | Mod: S$PBB,,, | Performed by: FAMILY MEDICINE

## 2022-01-13 PROCEDURE — 1160F RVW MEDS BY RX/DR IN RCRD: CPT | Mod: CPTII,,, | Performed by: FAMILY MEDICINE

## 2022-01-13 PROCEDURE — 3074F SYST BP LT 130 MM HG: CPT | Mod: CPTII,,, | Performed by: FAMILY MEDICINE

## 2022-01-13 PROCEDURE — 3078F DIAST BP <80 MM HG: CPT | Mod: CPTII,,, | Performed by: FAMILY MEDICINE

## 2022-01-13 PROCEDURE — 99999 PR PBB SHADOW E&M-EST. PATIENT-LVL IV: ICD-10-PCS | Mod: PBBFAC,,, | Performed by: FAMILY MEDICINE

## 2022-01-13 PROCEDURE — 36415 COLL VENOUS BLD VENIPUNCTURE: CPT | Performed by: FAMILY MEDICINE

## 2022-01-13 PROCEDURE — 86769 SARS-COV-2 COVID-19 ANTIBODY: CPT | Performed by: FAMILY MEDICINE

## 2022-01-13 PROCEDURE — 1159F MED LIST DOCD IN RCRD: CPT | Mod: CPTII,,, | Performed by: FAMILY MEDICINE

## 2022-01-13 PROCEDURE — 3078F PR MOST RECENT DIASTOLIC BLOOD PRESSURE < 80 MM HG: ICD-10-PCS | Mod: CPTII,,, | Performed by: FAMILY MEDICINE

## 2022-01-13 PROCEDURE — 99214 OFFICE O/P EST MOD 30 MIN: CPT | Mod: PBBFAC | Performed by: FAMILY MEDICINE

## 2022-01-13 PROCEDURE — 1160F PR REVIEW ALL MEDS BY PRESCRIBER/CLIN PHARMACIST DOCUMENTED: ICD-10-PCS | Mod: CPTII,,, | Performed by: FAMILY MEDICINE

## 2022-01-13 PROCEDURE — 80307 DRUG TEST PRSMV CHEM ANLYZR: CPT | Performed by: FAMILY MEDICINE

## 2022-01-13 PROCEDURE — 3074F PR MOST RECENT SYSTOLIC BLOOD PRESSURE < 130 MM HG: ICD-10-PCS | Mod: CPTII,,, | Performed by: FAMILY MEDICINE

## 2022-01-13 PROCEDURE — 99214 OFFICE O/P EST MOD 30 MIN: CPT | Mod: S$PBB,,, | Performed by: FAMILY MEDICINE

## 2022-01-13 PROCEDURE — 3008F PR BODY MASS INDEX (BMI) DOCUMENTED: ICD-10-PCS | Mod: CPTII,,, | Performed by: FAMILY MEDICINE

## 2022-01-13 PROCEDURE — U0005 INFEC AGEN DETEC AMPLI PROBE: HCPCS | Performed by: FAMILY MEDICINE

## 2022-01-13 PROCEDURE — 1159F PR MEDICATION LIST DOCUMENTED IN MEDICAL RECORD: ICD-10-PCS | Mod: CPTII,,, | Performed by: FAMILY MEDICINE

## 2022-01-13 PROCEDURE — U0003 INFECTIOUS AGENT DETECTION BY NUCLEIC ACID (DNA OR RNA); SEVERE ACUTE RESPIRATORY SYNDROME CORONAVIRUS 2 (SARS-COV-2) (CORONAVIRUS DISEASE [COVID-19]), AMPLIFIED PROBE TECHNIQUE, MAKING USE OF HIGH THROUGHPUT TECHNOLOGIES AS DESCRIBED BY CMS-2020-01-R: HCPCS | Performed by: FAMILY MEDICINE

## 2022-01-13 PROCEDURE — 99999 PR PBB SHADOW E&M-EST. PATIENT-LVL IV: CPT | Mod: PBBFAC,,, | Performed by: FAMILY MEDICINE

## 2022-01-13 PROCEDURE — 3008F BODY MASS INDEX DOCD: CPT | Mod: CPTII,,, | Performed by: FAMILY MEDICINE

## 2022-01-13 RX ORDER — CLONAZEPAM 1 MG/1
1 TABLET ORAL 2 TIMES DAILY
Qty: 60 TABLET | Refills: 0 | Status: SHIPPED | OUTPATIENT
Start: 2022-01-18

## 2022-01-13 RX ORDER — AMOXICILLIN 500 MG/1
500 TABLET, FILM COATED ORAL EVERY 12 HOURS
Qty: 20 TABLET | Refills: 0 | Status: SHIPPED | OUTPATIENT
Start: 2022-01-13 | End: 2022-01-23

## 2022-01-13 RX ORDER — CARISOPRODOL 350 MG/1
350 TABLET ORAL 2 TIMES DAILY PRN
Qty: 60 TABLET | Refills: 0 | Status: SHIPPED | OUTPATIENT
Start: 2022-01-18 | End: 2022-03-02

## 2022-01-13 RX ORDER — NABUMETONE 500 MG/1
500 TABLET, FILM COATED ORAL 2 TIMES DAILY PRN
Qty: 60 TABLET | Refills: 2 | Status: SHIPPED | OUTPATIENT
Start: 2022-01-13 | End: 2022-04-20 | Stop reason: SDUPTHER

## 2022-01-13 NOTE — PROGRESS NOTES
"[facility] - Clinic Note    Subjective      Mr. Bales is a 51 y.o. male who presents to clinic for Follow-up (Requesting refills)    With regard to patient's degenerative disc disease it is stable but you would like to try different medication than the diclofenac.  The Soma is helping.  He takes it when he needs it. Blood pressure is well controlled.  The Klonopin is helping with his anxiety.  He is having problems with his teeth he is going to have tooth pulled next week.    PMH Vincent has a past medical history of DDD (degenerative disc disease), thoracolumbar, Hyperlipidemia, and Hypertension.   PSXH Vincent has no past surgical history on file.    Vincent's family history includes Diabetes in his maternal grandmother; Hypertension in his mother.   SH Vincent reports that he has never smoked. He has never used smokeless tobacco. He reports that he does not drink alcohol and does not use drugs.   CALVIN Kaur is allergic to haloperidol.   MED Vincent has a current medication list which includes the following prescription(s): amlodipine, benzonatate, carisoprodol, citalopram, clonazepam, ezetimibe, fluticasone propionate, isosorbide mononitrate, metoprolol succinate, pantoprazole, quetiapine, rosuvastatin, amoxicillin, and nabumetone.     Review of Systems   Constitutional: Negative for chills and fever.   HENT: Positive for dental problem.    Eyes: Negative for visual disturbance.   Respiratory: Negative for shortness of breath.    Cardiovascular: Negative for chest pain.     Objective     /76 (BP Location: Left arm, Patient Position: Sitting, BP Method: Large (Manual))   Pulse 78   Temp 98.2 °F (36.8 °C) (Oral)   Resp 14   Ht 6' 1" (1.854 m)   Wt 107.4 kg (236 lb 12.8 oz)   SpO2 97%   BMI 31.24 kg/m²     Physical Exam  Vitals and nursing note reviewed.   Constitutional:       General: He is not in acute distress.     Appearance: Normal appearance. He is well-developed. He is not diaphoretic.   HENT:      " Head: Normocephalic and atraumatic.      Right Ear: External ear normal.      Left Ear: External ear normal.   Eyes:      General:         Right eye: No discharge.         Left eye: No discharge.   Cardiovascular:      Rate and Rhythm: Normal rate and regular rhythm.      Heart sounds: Normal heart sounds.   Pulmonary:      Effort: Pulmonary effort is normal.      Breath sounds: Normal breath sounds. No wheezing or rales.   Skin:     General: Skin is warm and dry.   Neurological:      Mental Status: He is alert and oriented to person, place, and time. Mental status is at baseline.   Psychiatric:         Mood and Affect: Mood normal.         Behavior: Behavior normal.         Thought Content: Thought content normal.         Judgment: Judgment normal.        Assessment/Plan     1. DDD (degenerative disc disease), thoracolumbar  [facility] Drug Screen    nabumetone (RELAFEN) 500 MG tablet   2. Anxiety  [facility] Drug Screen   3. Dental infection  amoxicillin (AMOXIL) 500 MG Tab     Continue Klonopin.  Refills of medications provided.   reviewed.  No red flags. UDS today.  Change to nabumetone. Follow-up in 3 months.    No future appointments.      Kin Domínguez MD  Family Medicine  [facility]

## 2022-01-13 NOTE — PROGRESS NOTES
Vincent SHERRELL Nii presented to clinic for COVID-19 swab.   Vincent Bales verified x2, name and .   Vincent Bales instructed on what will be completed, asked if ever had COVID-19 swab.   Explained procedure to Vincent Bales.   Specimen obtained.   No questions or concerns voiced further at this time.   Vincent Bales left in satisfactory condition.

## 2022-01-14 ENCOUNTER — TELEPHONE (OUTPATIENT)
Dept: FAMILY MEDICINE | Facility: CLINIC | Age: 52
End: 2022-01-14
Payer: MEDICAID

## 2022-01-14 NOTE — TELEPHONE ENCOUNTER
----- Message from Kristin Loza sent at 1/14/2022 11:27 AM CST -----  Contact: pt  Type: Needs Medical Advice  Who Called:  pt   Best Call Back Number: [phone]    Additional Information: please call pt regarding COVID test results

## 2022-01-14 NOTE — TELEPHONE ENCOUNTER
Dear Vincent Bales,    Your Covid-19 test result is negative.      Guidance  Wear a mask indoors in public.      Quarantine if you have been in close contact (within 6 feet of someone for a cumulative total of 15 minutes or more over a 24-hour period) with someone who has Covid-19, unless you have been fully vaccinated.    People who are fully vaccinated do NOT need to quarantine after contact with someone who had Covid-19 unless they have symptoms.      If you are not fully vaccinated and do not have or develop symptoms:  o stay home for 10 days after your last contact with a person who has Covid-19.    o Watch for fever (100.4F), cough, shortness of breath, or other symptoms of Covid-19.  o If possible, stay away from people you live with, especially people who are at higher risk for getting very sick from Covid-19.    If you have or develop symptoms within 14 days of exposure:   Wear a mask when around other people.    Immediately self-isolate.  Stay home and separate from others.  Stay in a specific sick room or area, and use a separate bathroom (if available).     Monitor your symptoms.  If you have an emergency warning sign (including trouble breathing), seek emergency medical care immediately.        Lizabeth Simmons LPN

## 2022-01-17 LAB
6MAM UR QL: NOT DETECTED
7AMINOCLONAZEPAM UR QL: PRESENT
A-OH ALPRAZ UR QL: NOT DETECTED
ALPHA-OH-MIDAZOLAM: NOT DETECTED
ALPRAZ UR QL: NOT DETECTED
AMPHET UR QL SCN: NOT DETECTED
ANNOTATION COMMENT IMP: NORMAL
ANNOTATION COMMENT IMP: NORMAL
BARBITURATES UR QL: NOT DETECTED
BUPRENORPHINE UR QL: NOT DETECTED
BZE UR QL: NOT DETECTED
CARBOXYTHC UR QL: NOT DETECTED
CARISOPRODOL UR QL: PRESENT
CLONAZEPAM UR QL: NOT DETECTED
CODEINE UR QL: NOT DETECTED
CREAT UR-MCNC: 75.5 MG/DL (ref 20–400)
DIAZEPAM UR QL: NOT DETECTED
ETHYL GLUCURONIDE UR QL: NOT DETECTED
FENTANYL UR QL: NOT DETECTED
GABAPENTIN: NOT DETECTED
HYDROCODONE UR QL: NOT DETECTED
HYDROMORPHONE UR QL: NOT DETECTED
LORAZEPAM UR QL: NOT DETECTED
MDA UR QL: NOT DETECTED
MDEA UR QL: NOT DETECTED
MDMA UR QL: NOT DETECTED
ME-PHENIDATE UR QL: NOT DETECTED
METHADONE UR QL: NOT DETECTED
METHAMPHET UR QL: NOT DETECTED
MIDAZOLAM UR QL SCN: NOT DETECTED
MORPHINE UR QL: NOT DETECTED
NALOXONE: NOT DETECTED
NORBUPRENORPHINE UR QL CFM: NOT DETECTED
NORDIAZEPAM UR QL: NOT DETECTED
NORFENTANYL UR QL: NOT DETECTED
NORHYDROCODONE UR QL CFM: NOT DETECTED
NORMEPERIDINE UR QL CFM: NOT DETECTED
NOROXYCODONE UR QL CFM: NOT DETECTED
NOROXYMORPHONE UR QL SCN: NOT DETECTED
OXAZEPAM UR QL: NOT DETECTED
OXYCODONE UR QL: NOT DETECTED
OXYMORPHONE UR QL: NOT DETECTED
PATHOLOGY STUDY: NORMAL
PCP UR QL: NOT DETECTED
PHENTERMINE UR QL: NOT DETECTED
PREGABALIN: NOT DETECTED
SERVICE CMNT-IMP: NORMAL
TAPENTADOL UR QL SCN: NOT DETECTED
TAPENTADOL UR QL SCN: NOT DETECTED
TEMAZEPAM UR QL: NOT DETECTED
TRAMADOL UR QL: NOT DETECTED
ZOLPIDEM METABOLITE: NOT DETECTED
ZOLPIDEM UR QL: NOT DETECTED

## 2022-03-01 DIAGNOSIS — M51.35 DDD (DEGENERATIVE DISC DISEASE), THORACOLUMBAR: ICD-10-CM

## 2022-03-02 RX ORDER — CARISOPRODOL 350 MG/1
350 TABLET ORAL 2 TIMES DAILY PRN
Qty: 60 TABLET | Refills: 0 | Status: SHIPPED | OUTPATIENT
Start: 2022-03-02 | End: 2022-04-01

## 2022-03-08 ENCOUNTER — TELEPHONE (OUTPATIENT)
Dept: FAMILY MEDICINE | Facility: CLINIC | Age: 52
End: 2022-03-08
Payer: MEDICAID

## 2022-03-08 DIAGNOSIS — K04.7 DENTAL INFECTION: Primary | ICD-10-CM

## 2022-03-08 NOTE — TELEPHONE ENCOUNTER
Patient is requesting antibiotics for oral surgery.     Patient is requesting RX for URI- sinus drip, cough, denies fever.

## 2022-03-08 NOTE — TELEPHONE ENCOUNTER
----- Message from Rita Mireles sent at 3/8/2022  9:32 AM CST -----  Contact: CELIA JUAN [[id number]]  Type: Patient Call Back    Who called:CELIA JUAN [[id number]]    What is the request in detail: The patient would like antibiotics for tooth surgery     Can the clinic reply by [facility]?    Would the patient rather a call back or a response via [facility]?     Best call back number: [phone] (mobile)    Additional Information:

## 2022-03-09 RX ORDER — CLINDAMYCIN HYDROCHLORIDE 300 MG/1
300 CAPSULE ORAL EVERY 8 HOURS
Qty: 30 CAPSULE | Refills: 0 | Status: SHIPPED | OUTPATIENT
Start: 2022-03-09 | End: 2022-03-19

## 2022-04-20 ENCOUNTER — OFFICE VISIT (OUTPATIENT)
Dept: FAMILY MEDICINE | Facility: CLINIC | Age: 52
End: 2022-04-20
Payer: MEDICAID

## 2022-04-20 VITALS
WEIGHT: 222.63 LBS | HEART RATE: 63 BPM | RESPIRATION RATE: 18 BRPM | TEMPERATURE: 98 F | BODY MASS INDEX: 29.5 KG/M2 | OXYGEN SATURATION: 99 % | DIASTOLIC BLOOD PRESSURE: 75 MMHG | SYSTOLIC BLOOD PRESSURE: 120 MMHG | HEIGHT: 73 IN

## 2022-04-20 DIAGNOSIS — M51.35 DDD (DEGENERATIVE DISC DISEASE), THORACOLUMBAR: ICD-10-CM

## 2022-04-20 PROCEDURE — 99213 OFFICE O/P EST LOW 20 MIN: CPT | Mod: S$PBB,,, | Performed by: FAMILY MEDICINE

## 2022-04-20 PROCEDURE — 3008F BODY MASS INDEX DOCD: CPT | Mod: CPTII,,, | Performed by: FAMILY MEDICINE

## 2022-04-20 PROCEDURE — 3078F PR MOST RECENT DIASTOLIC BLOOD PRESSURE < 80 MM HG: ICD-10-PCS | Mod: CPTII,,, | Performed by: FAMILY MEDICINE

## 2022-04-20 PROCEDURE — 3074F SYST BP LT 130 MM HG: CPT | Mod: CPTII,,, | Performed by: FAMILY MEDICINE

## 2022-04-20 PROCEDURE — 1159F PR MEDICATION LIST DOCUMENTED IN MEDICAL RECORD: ICD-10-PCS | Mod: CPTII,,, | Performed by: FAMILY MEDICINE

## 2022-04-20 PROCEDURE — 3078F DIAST BP <80 MM HG: CPT | Mod: CPTII,,, | Performed by: FAMILY MEDICINE

## 2022-04-20 PROCEDURE — 99214 OFFICE O/P EST MOD 30 MIN: CPT | Mod: PBBFAC | Performed by: FAMILY MEDICINE

## 2022-04-20 PROCEDURE — 99213 PR OFFICE/OUTPT VISIT, EST, LEVL III, 20-29 MIN: ICD-10-PCS | Mod: S$PBB,,, | Performed by: FAMILY MEDICINE

## 2022-04-20 PROCEDURE — 99999 PR PBB SHADOW E&M-EST. PATIENT-LVL IV: CPT | Mod: PBBFAC,,, | Performed by: FAMILY MEDICINE

## 2022-04-20 PROCEDURE — 99999 PR PBB SHADOW E&M-EST. PATIENT-LVL IV: ICD-10-PCS | Mod: PBBFAC,,, | Performed by: FAMILY MEDICINE

## 2022-04-20 PROCEDURE — 1160F RVW MEDS BY RX/DR IN RCRD: CPT | Mod: CPTII,,, | Performed by: FAMILY MEDICINE

## 2022-04-20 PROCEDURE — 3074F PR MOST RECENT SYSTOLIC BLOOD PRESSURE < 130 MM HG: ICD-10-PCS | Mod: CPTII,,, | Performed by: FAMILY MEDICINE

## 2022-04-20 PROCEDURE — 1160F PR REVIEW ALL MEDS BY PRESCRIBER/CLIN PHARMACIST DOCUMENTED: ICD-10-PCS | Mod: CPTII,,, | Performed by: FAMILY MEDICINE

## 2022-04-20 PROCEDURE — 1159F MED LIST DOCD IN RCRD: CPT | Mod: CPTII,,, | Performed by: FAMILY MEDICINE

## 2022-04-20 PROCEDURE — 3008F PR BODY MASS INDEX (BMI) DOCUMENTED: ICD-10-PCS | Mod: CPTII,,, | Performed by: FAMILY MEDICINE

## 2022-04-20 RX ORDER — NABUMETONE 500 MG/1
500 TABLET, FILM COATED ORAL 2 TIMES DAILY PRN
Qty: 60 TABLET | Refills: 2 | Status: SHIPPED | OUTPATIENT
Start: 2022-04-20 | End: 2023-01-18

## 2022-04-20 RX ORDER — CARISOPRODOL 350 MG/1
350 TABLET ORAL 2 TIMES DAILY PRN
Qty: 60 TABLET | Refills: 2 | Status: SHIPPED | OUTPATIENT
Start: 2022-04-29 | End: 2022-06-13 | Stop reason: SDUPTHER

## 2022-04-20 NOTE — PROGRESS NOTES
"[facility] - Clinic Note    Subjective      Mr. Bales is a 51 y.o. male who presents to clinic for 3 month follow up     With regard to patient's degenerative disc disease it is stable. The Soma is helping.  He takes it when he needs it. Blood pressure is well controlled.  The Klonopin is helping with his anxiety.      PMH Vincent has a past medical history of DDD (degenerative disc disease), thoracolumbar, Hyperlipidemia, and Hypertension.   PSXH Vincent has no past surgical history on file.    Vincent's family history includes Diabetes in his maternal grandmother; Hypertension in his mother.   SH Vincent reports that he has never smoked. He has never used smokeless tobacco. He reports that he does not drink alcohol and does not use drugs.   ALG Vincent is allergic to haloperidol.   ALBERTINA Kaur has a current medication list which includes the following prescription(s): amlodipine, benzonatate, citalopram, clonazepam, ezetimibe, fluticasone propionate, isosorbide mononitrate, metoprolol succinate, pantoprazole, quetiapine, rosuvastatin, [START ON 4/29/2022] carisoprodol, and nabumetone.     Review of Systems   Constitutional: Negative for chills and fever.   HENT: Positive for dental problem.    Eyes: Negative for visual disturbance.   Respiratory: Negative for shortness of breath.    Cardiovascular: Negative for chest pain.     Objective     /75 (BP Location: Right arm, Patient Position: Sitting, BP Method: Medium (Automatic))   Pulse 63   Temp 98.4 °F (36.9 °C) (Oral)   Resp 18   Ht 6' 1" (1.854 m)   Wt 101 kg (222 lb 9.6 oz)   SpO2 99%   BMI 29.37 kg/m²     Physical Exam  Vitals and nursing note reviewed.   Constitutional:       General: He is not in acute distress.     Appearance: Normal appearance. He is well-developed. He is not diaphoretic.   HENT:      Head: Normocephalic and atraumatic.      Right Ear: External ear normal.      Left Ear: External ear normal.   Eyes:      General:         Right eye: No " discharge.         Left eye: No discharge.   Cardiovascular:      Rate and Rhythm: Normal rate and regular rhythm.      Heart sounds: Normal heart sounds.   Pulmonary:      Effort: Pulmonary effort is normal.      Breath sounds: Normal breath sounds. No wheezing or rales.   Skin:     General: Skin is warm and dry.   Neurological:      Mental Status: He is alert and oriented to person, place, and time. Mental status is at baseline.   Psychiatric:         Mood and Affect: Mood normal.         Behavior: Behavior normal.         Thought Content: Thought content normal.         Judgment: Judgment normal.        Assessment/Plan     1. DDD (degenerative disc disease), thoracolumbar  nabumetone (RELAFEN) 500 MG tablet    carisoprodoL (SOMA) 350 MG tablet     Continue medication.  Refills of medications provided.   reviewed.  No red flags. UDS appropriate. Follow-up in 3 months.    Future Appointments   Date Time Provider Department Center   7/27/2022  3:40 PM Kin Domínguez MD [facility]         Kin Domínguez MD  Family Medicine  [facility]

## 2022-06-13 DIAGNOSIS — M51.35 DDD (DEGENERATIVE DISC DISEASE), THORACOLUMBAR: ICD-10-CM

## 2022-06-13 NOTE — TELEPHONE ENCOUNTER
Please see patient message regarding refills    LOV 04/20/2022 with dr ocasio  Med last filled 04/29/2022 by dr ocasio ([facility])  Med last filled 11/01/2021 by dr ocasio ([facility])

## 2022-06-13 NOTE — TELEPHONE ENCOUNTER
----- Message from Katey Pineda sent at 6/13/2022  2:35 PM CDT -----  Contact: pt  Type:  RX Refill Request    Who Called:  Patient   Refill   RX Name and Strength:  #Soma and Tessalon Pearls   How is the patient currently taking it? (ex. 1XDay):  daily   Is this a 30 day or 90 day RX:  30  Preferred Pharmacy with phone number:  [facility]   Local or Mail Order:  local   Ordering Provider:  Dr Domínguez   Best Call Back Number:  [phone] (home)    Additional Information:  Patient called he is asking for a refill

## 2022-06-15 RX ORDER — CARISOPRODOL 350 MG/1
350 TABLET ORAL 2 TIMES DAILY PRN
Qty: 60 TABLET | Refills: 0 | Status: SHIPPED | OUTPATIENT
Start: 2022-06-24 | End: 2022-07-20

## 2022-06-15 RX ORDER — BENZONATATE 100 MG/1
CAPSULE ORAL
Qty: 90 CAPSULE | Refills: 0 | Status: SHIPPED | OUTPATIENT
Start: 2022-06-15 | End: 2023-01-30

## 2022-07-27 ENCOUNTER — OFFICE VISIT (OUTPATIENT)
Dept: FAMILY MEDICINE | Facility: CLINIC | Age: 52
End: 2022-07-27
Payer: MEDICAID

## 2022-07-27 VITALS
DIASTOLIC BLOOD PRESSURE: 76 MMHG | HEIGHT: 73 IN | SYSTOLIC BLOOD PRESSURE: 128 MMHG | BODY MASS INDEX: 29.93 KG/M2 | WEIGHT: 225.81 LBS | RESPIRATION RATE: 14 BRPM | HEART RATE: 70 BPM | TEMPERATURE: 98 F | OXYGEN SATURATION: 99 %

## 2022-07-27 DIAGNOSIS — M51.35 DDD (DEGENERATIVE DISC DISEASE), THORACOLUMBAR: ICD-10-CM

## 2022-07-27 PROCEDURE — 1160F PR REVIEW ALL MEDS BY PRESCRIBER/CLIN PHARMACIST DOCUMENTED: ICD-10-PCS | Mod: CPTII,,, | Performed by: FAMILY MEDICINE

## 2022-07-27 PROCEDURE — 99213 PR OFFICE/OUTPT VISIT, EST, LEVL III, 20-29 MIN: ICD-10-PCS | Mod: S$PBB,,, | Performed by: FAMILY MEDICINE

## 2022-07-27 PROCEDURE — 99213 OFFICE O/P EST LOW 20 MIN: CPT | Mod: S$PBB,,, | Performed by: FAMILY MEDICINE

## 2022-07-27 PROCEDURE — 3074F SYST BP LT 130 MM HG: CPT | Mod: CPTII,,, | Performed by: FAMILY MEDICINE

## 2022-07-27 PROCEDURE — 99999 PR PBB SHADOW E&M-EST. PATIENT-LVL IV: CPT | Mod: PBBFAC,,, | Performed by: FAMILY MEDICINE

## 2022-07-27 PROCEDURE — 1159F MED LIST DOCD IN RCRD: CPT | Mod: CPTII,,, | Performed by: FAMILY MEDICINE

## 2022-07-27 PROCEDURE — 3078F DIAST BP <80 MM HG: CPT | Mod: CPTII,,, | Performed by: FAMILY MEDICINE

## 2022-07-27 PROCEDURE — 3008F BODY MASS INDEX DOCD: CPT | Mod: CPTII,,, | Performed by: FAMILY MEDICINE

## 2022-07-27 PROCEDURE — 99214 OFFICE O/P EST MOD 30 MIN: CPT | Mod: PBBFAC | Performed by: FAMILY MEDICINE

## 2022-07-27 PROCEDURE — 3078F PR MOST RECENT DIASTOLIC BLOOD PRESSURE < 80 MM HG: ICD-10-PCS | Mod: CPTII,,, | Performed by: FAMILY MEDICINE

## 2022-07-27 PROCEDURE — 99999 PR PBB SHADOW E&M-EST. PATIENT-LVL IV: ICD-10-PCS | Mod: PBBFAC,,, | Performed by: FAMILY MEDICINE

## 2022-07-27 PROCEDURE — 3074F PR MOST RECENT SYSTOLIC BLOOD PRESSURE < 130 MM HG: ICD-10-PCS | Mod: CPTII,,, | Performed by: FAMILY MEDICINE

## 2022-07-27 PROCEDURE — 1160F RVW MEDS BY RX/DR IN RCRD: CPT | Mod: CPTII,,, | Performed by: FAMILY MEDICINE

## 2022-07-27 PROCEDURE — 3008F PR BODY MASS INDEX (BMI) DOCUMENTED: ICD-10-PCS | Mod: CPTII,,, | Performed by: FAMILY MEDICINE

## 2022-07-27 PROCEDURE — 1159F PR MEDICATION LIST DOCUMENTED IN MEDICAL RECORD: ICD-10-PCS | Mod: CPTII,,, | Performed by: FAMILY MEDICINE

## 2022-07-27 RX ORDER — CARISOPRODOL 350 MG/1
350 TABLET ORAL 2 TIMES DAILY PRN
Qty: 60 TABLET | Refills: 2 | Status: SHIPPED | OUTPATIENT
Start: 2022-07-27 | End: 2022-11-02 | Stop reason: SDUPTHER

## 2022-07-27 NOTE — PROGRESS NOTES
"[facility] - Clinic Note    Subjective      Mr. Bales is a 51 y.o. male who presents to clinic for Follow-up (3mth follow up requesting refills)    With regard to patient's degenerative disc disease it is stable. The Soma is helping.  He takes it when he needs it. Blood pressure is well controlled.  The Klonopin is helping with his anxiety.      PMH Vincent has a past medical history of DDD (degenerative disc disease), thoracolumbar, Hyperlipidemia, and Hypertension.   PSXH Vincent has no past surgical history on file.    Vincent's family history includes Diabetes in his maternal grandmother; Hypertension in his mother.   SH Vincent reports that he has never smoked. He has never used smokeless tobacco. He reports that he does not drink alcohol and does not use drugs.   ALG Vincent is allergic to haloperidol.   ALBERTINA Kaur has a current medication list which includes the following prescription(s): amlodipine, benzonatate, citalopram, clonazepam, ezetimibe, fluticasone propionate, isosorbide mononitrate, metoprolol succinate, pantoprazole, quetiapine, rosuvastatin, carisoprodol, and nabumetone.     Review of Systems   Constitutional: Negative for chills and fever.   HENT: Positive for dental problem.    Eyes: Negative for visual disturbance.   Respiratory: Negative for shortness of breath.    Cardiovascular: Negative for chest pain.     Objective     /76 (BP Location: Left arm, Patient Position: Sitting, BP Method: Large (Manual))   Pulse 70   Temp 97.5 °F (36.4 °C) (Temporal)   Resp 14   Ht 6' 1" (1.854 m)   Wt 102.4 kg (225 lb 12.8 oz)   SpO2 99%   BMI 29.79 kg/m²     Physical Exam  Vitals and nursing note reviewed.   Constitutional:       General: He is not in acute distress.     Appearance: Normal appearance. He is well-developed. He is not diaphoretic.   HENT:      Head: Normocephalic and atraumatic.      Right Ear: External ear normal.      Left Ear: External ear normal.   Eyes:      General:         Right " eye: No discharge.         Left eye: No discharge.   Cardiovascular:      Rate and Rhythm: Normal rate and regular rhythm.      Heart sounds: Normal heart sounds.   Pulmonary:      Effort: Pulmonary effort is normal.      Breath sounds: Normal breath sounds. No wheezing or rales.   Skin:     General: Skin is warm and dry.   Neurological:      Mental Status: He is alert and oriented to person, place, and time. Mental status is at baseline.   Psychiatric:         Mood and Affect: Mood normal.         Behavior: Behavior normal.         Thought Content: Thought content normal.         Judgment: Judgment normal.        Assessment/Plan     1. DDD (degenerative disc disease), thoracolumbar  carisoprodoL (SOMA) 350 MG tablet     Continue medication.  Refills of medications provided.   reviewed.  No red flags.  Follow-up in 3 months.    Future Appointments   Date Time Provider Department Center   11/2/2022  3:40 PM Kin Domínguez MD [facility] [place] [facility] DORA Domínguez MD  Family Medicine  [facility]

## 2022-09-14 DIAGNOSIS — I10 HYPERTENSION: ICD-10-CM

## 2022-11-02 ENCOUNTER — OFFICE VISIT (OUTPATIENT)
Dept: FAMILY MEDICINE | Facility: CLINIC | Age: 52
End: 2022-11-02
Payer: MEDICAID

## 2022-11-02 VITALS
BODY MASS INDEX: 30.24 KG/M2 | SYSTOLIC BLOOD PRESSURE: 126 MMHG | HEIGHT: 73 IN | HEART RATE: 70 BPM | RESPIRATION RATE: 16 BRPM | TEMPERATURE: 98 F | DIASTOLIC BLOOD PRESSURE: 74 MMHG | WEIGHT: 228.13 LBS | OXYGEN SATURATION: 97 %

## 2022-11-02 DIAGNOSIS — F41.9 ANXIETY: ICD-10-CM

## 2022-11-02 DIAGNOSIS — K76.0 NAFLD (NONALCOHOLIC FATTY LIVER DISEASE): ICD-10-CM

## 2022-11-02 DIAGNOSIS — Z23 NEED FOR PNEUMOCOCCAL VACCINATION: ICD-10-CM

## 2022-11-02 DIAGNOSIS — M51.35 DDD (DEGENERATIVE DISC DISEASE), THORACOLUMBAR: ICD-10-CM

## 2022-11-02 DIAGNOSIS — Z00.00 ANNUAL PHYSICAL EXAM: Primary | ICD-10-CM

## 2022-11-02 PROCEDURE — 1159F PR MEDICATION LIST DOCUMENTED IN MEDICAL RECORD: ICD-10-PCS | Mod: CPTII,,, | Performed by: FAMILY MEDICINE

## 2022-11-02 PROCEDURE — 1160F PR REVIEW ALL MEDS BY PRESCRIBER/CLIN PHARMACIST DOCUMENTED: ICD-10-PCS | Mod: CPTII,,, | Performed by: FAMILY MEDICINE

## 2022-11-02 PROCEDURE — 90472 IMMUNIZATION ADMIN EACH ADD: CPT | Mod: PBBFAC

## 2022-11-02 PROCEDURE — 1159F MED LIST DOCD IN RCRD: CPT | Mod: CPTII,,, | Performed by: FAMILY MEDICINE

## 2022-11-02 PROCEDURE — 3078F PR MOST RECENT DIASTOLIC BLOOD PRESSURE < 80 MM HG: ICD-10-PCS | Mod: CPTII,,, | Performed by: FAMILY MEDICINE

## 2022-11-02 PROCEDURE — 3008F BODY MASS INDEX DOCD: CPT | Mod: CPTII,,, | Performed by: FAMILY MEDICINE

## 2022-11-02 PROCEDURE — 3078F DIAST BP <80 MM HG: CPT | Mod: CPTII,,, | Performed by: FAMILY MEDICINE

## 2022-11-02 PROCEDURE — 99396 PREV VISIT EST AGE 40-64: CPT | Mod: S$PBB,,, | Performed by: FAMILY MEDICINE

## 2022-11-02 PROCEDURE — 3074F PR MOST RECENT SYSTOLIC BLOOD PRESSURE < 130 MM HG: ICD-10-PCS | Mod: CPTII,,, | Performed by: FAMILY MEDICINE

## 2022-11-02 PROCEDURE — 99999 PR PBB SHADOW E&M-EST. PATIENT-LVL V: ICD-10-PCS | Mod: PBBFAC,,, | Performed by: FAMILY MEDICINE

## 2022-11-02 PROCEDURE — 90471 IMMUNIZATION ADMIN: CPT | Mod: PBBFAC

## 2022-11-02 PROCEDURE — 99396 PR PREVENTIVE VISIT,EST,40-64: ICD-10-PCS | Mod: S$PBB,,, | Performed by: FAMILY MEDICINE

## 2022-11-02 PROCEDURE — 99215 OFFICE O/P EST HI 40 MIN: CPT | Mod: PBBFAC | Performed by: FAMILY MEDICINE

## 2022-11-02 PROCEDURE — 99999 PR PBB SHADOW E&M-EST. PATIENT-LVL V: CPT | Mod: PBBFAC,,, | Performed by: FAMILY MEDICINE

## 2022-11-02 PROCEDURE — 3008F PR BODY MASS INDEX (BMI) DOCUMENTED: ICD-10-PCS | Mod: CPTII,,, | Performed by: FAMILY MEDICINE

## 2022-11-02 PROCEDURE — 3074F SYST BP LT 130 MM HG: CPT | Mod: CPTII,,, | Performed by: FAMILY MEDICINE

## 2022-11-02 PROCEDURE — 1160F RVW MEDS BY RX/DR IN RCRD: CPT | Mod: CPTII,,, | Performed by: FAMILY MEDICINE

## 2022-11-02 PROCEDURE — 80326 AMPHETAMINES 5 OR MORE: CPT | Performed by: FAMILY MEDICINE

## 2022-11-02 RX ORDER — CARISOPRODOL 350 MG/1
350 TABLET ORAL 2 TIMES DAILY PRN
Qty: 60 TABLET | Refills: 2 | Status: SHIPPED | OUTPATIENT
Start: 2022-11-02 | End: 2023-02-27 | Stop reason: SDUPTHER

## 2022-11-02 NOTE — PROGRESS NOTES
"[facility] - Clinic Note    Subjective      Mr. Bales is a 51 y.o. male who presents to clinic for Follow-up (3mth follow up/refills)    With regard to patient's degenerative disc disease it is stable. The Soma is helping.  He takes it when he needs it. Blood pressure is well controlled.  The Klonopin is helping with his anxiety.      PMH Vincent has a past medical history of DDD (degenerative disc disease), thoracolumbar, Hyperlipidemia, and Hypertension.   PSXH Vincent has no past surgical history on file.    Vincent's family history includes Diabetes in his maternal grandmother; Hypertension in his mother.   SH Vincent reports that he has never smoked. He has never used smokeless tobacco. He reports that he does not drink alcohol and does not use drugs.   ALG Vincent is allergic to haloperidol.   ALBERTINA Kaur has a current medication list which includes the following prescription(s): amlodipine, benzonatate, citalopram, clonazepam, ezetimibe, fluticasone propionate, isosorbide mononitrate, metoprolol succinate, nabumetone, quetiapine, rosuvastatin, carisoprodol, and pantoprazole.     Review of Systems   Constitutional:  Negative for chills and fever.   HENT:  Negative for congestion, dental problem and rhinorrhea.    Eyes:  Negative for visual disturbance.   Respiratory:  Negative for cough and shortness of breath.    Cardiovascular:  Negative for chest pain.   Gastrointestinal:  Negative for abdominal pain, constipation, diarrhea, nausea and vomiting.   Genitourinary:  Negative for dysuria.   Musculoskeletal:  Negative for myalgias.   Skin:  Negative for rash.   Neurological:  Negative for weakness and headaches.   Objective     /74 (BP Location: Right arm, Patient Position: Sitting, BP Method: Large (Manual))   Pulse 70   Temp 97.8 °F (36.6 °C) (Temporal)   Resp 16   Ht 6' 1" (1.854 m)   Wt 103.5 kg (228 lb 1.6 oz)   SpO2 97%   BMI 30.09 kg/m²     Physical Exam  Vitals and nursing note reviewed. "   Constitutional:       General: He is not in acute distress.     Appearance: Normal appearance. He is well-developed. He is not diaphoretic.   HENT:      Head: Normocephalic and atraumatic.      Right Ear: External ear normal.      Left Ear: External ear normal.   Eyes:      General:         Right eye: No discharge.         Left eye: No discharge.   Cardiovascular:      Rate and Rhythm: Normal rate and regular rhythm.      Heart sounds: Normal heart sounds.   Pulmonary:      Effort: Pulmonary effort is normal.      Breath sounds: Normal breath sounds. No wheezing or rales.   Skin:     General: Skin is warm and dry.   Neurological:      Mental Status: He is alert and oriented to person, place, and time. Mental status is at baseline.   Psychiatric:         Mood and Affect: Mood normal.         Behavior: Behavior normal.         Thought Content: Thought content normal.         Judgment: Judgment normal.      Assessment/Plan     1. Annual physical exam  Lipid Panel    Comprehensive Metabolic Panel    CBC Auto Differential    Hemoglobin A1C    TSH      2. DDD (degenerative disc disease), thoracolumbar  [facility] Drug Screen    [facility] Clinic Drug Screen    carisoprodoL (SOMA) 350 MG tablet      3. Anxiety  [facility] Drug Screen    [facility] Clinic Drug Screen      4. NAFLD (nonalcoholic fatty liver disease)  US Abdomen Limited    US Abdomen Limited      5. Need for pneumococcal vaccination  (In Office Administered) Pneumococcal Polysaccharide Vaccine (23 Valent) (SQ/IM)        Continue medication.  Refills of medications provided.   reviewed.  No red flags.  Follow-up in 3 months.  Due for yearly labs as above.  Obtain ultrasound of the liver to evaluate.  UDS today.  Pneumovax and flu vaccinations today.    Future Appointments   Date Time Provider Department Center   12/6/2022  7:50 AM [facility], LABORATORY [facility] LAB [facility]   12/6/2022 10:00 AM [facility] US1 [facility]   2/7/2023 10:00 AM Kin Domínguez MD  [facility] DORA Domínguez MD  Family Medicine  [facility]

## 2022-11-02 NOTE — PROGRESS NOTES
Vincent WYNNE Nii arrive to clinic awake and alert.  Pt verified name and .   Allergies reviewed with patient.  Pt given flu and pneumo 23 vaccine via intramuscular per provider orders.    Pt tolerated well and denies further needs.    Patient instructed to wait 15 mins after injection.   Patient states no vaccines in the last 14 days.  Vaccine information sheet was given to patient. Patient voiced understanding.    Ludmila Koroma LPN

## 2022-11-08 LAB
6MAM UR QL: NOT DETECTED
7AMINOCLONAZEPAM UR QL: PRESENT
A-OH ALPRAZ UR QL: NOT DETECTED
ALPHA-OH-MIDAZOLAM: NOT DETECTED
ALPRAZ UR QL: NOT DETECTED
AMPHET UR QL SCN: NOT DETECTED
ANNOTATION COMMENT IMP: NORMAL
ANNOTATION COMMENT IMP: NORMAL
BARBITURATES UR QL: NOT DETECTED
BUPRENORPHINE UR QL: NOT DETECTED
BZE UR QL: NOT DETECTED
CARBOXYTHC UR QL: NOT DETECTED
CARISOPRODOL UR QL: PRESENT
CLONAZEPAM UR QL: NOT DETECTED
CODEINE UR QL: NOT DETECTED
CREAT UR-MCNC: 176.3 MG/DL (ref 20–400)
DIAZEPAM UR QL: NOT DETECTED
ETHYL GLUCURONIDE UR QL: NOT DETECTED
FENTANYL UR QL: NOT DETECTED
GABAPENTIN: NOT DETECTED
HYDROCODONE UR QL: NOT DETECTED
HYDROMORPHONE UR QL: NOT DETECTED
LORAZEPAM UR QL: NOT DETECTED
MDA UR QL: NOT DETECTED
MDEA UR QL: NOT DETECTED
MDMA UR QL: NOT DETECTED
ME-PHENIDATE UR QL: NOT DETECTED
METHADONE UR QL: NOT DETECTED
METHAMPHET UR QL: NOT DETECTED
MIDAZOLAM UR QL SCN: NOT DETECTED
MORPHINE UR QL: NOT DETECTED
NALOXONE: NOT DETECTED
NORBUPRENORPHINE UR QL CFM: NOT DETECTED
NORDIAZEPAM UR QL: NOT DETECTED
NORFENTANYL UR QL: NOT DETECTED
NORHYDROCODONE UR QL CFM: NOT DETECTED
NORMEPERIDINE UR QL CFM: NOT DETECTED
NOROXYCODONE UR QL CFM: NOT DETECTED
NOROXYMORPHONE UR QL SCN: NOT DETECTED
OXAZEPAM UR QL: NOT DETECTED
OXYCODONE UR QL: NOT DETECTED
OXYMORPHONE UR QL: NOT DETECTED
PATHOLOGY STUDY: NORMAL
PCP UR QL: NOT DETECTED
PHENTERMINE UR QL: NOT DETECTED
PREGABALIN: NOT DETECTED
SERVICE CMNT-IMP: NORMAL
TAPENTADOL UR QL SCN: NOT DETECTED
TAPENTADOL UR QL SCN: NOT DETECTED
TEMAZEPAM UR QL: NOT DETECTED
TRAMADOL UR QL: NOT DETECTED
ZOLPIDEM METABOLITE: NOT DETECTED
ZOLPIDEM UR QL: NOT DETECTED

## 2022-12-06 ENCOUNTER — HOSPITAL ENCOUNTER (OUTPATIENT)
Dept: RADIOLOGY | Facility: HOSPITAL | Age: 52
Discharge: HOME | End: 2022-12-06
Attending: FAMILY MEDICINE
Payer: MEDICAID

## 2022-12-06 PROCEDURE — 76705 ECHO EXAM OF ABDOMEN: CPT | Mod: TC

## 2022-12-06 PROCEDURE — 76705 US ABDOMEN LIMITED: ICD-10-PCS | Mod: 26,,, | Performed by: RADIOLOGY

## 2022-12-06 PROCEDURE — 76705 ECHO EXAM OF ABDOMEN: CPT | Mod: 26,,, | Performed by: RADIOLOGY

## 2022-12-14 ENCOUNTER — PATIENT MESSAGE (OUTPATIENT)
Dept: FAMILY MEDICINE | Facility: CLINIC | Age: 52
End: 2022-12-14
Payer: MEDICAID

## 2022-12-15 ENCOUNTER — TELEPHONE (OUTPATIENT)
Dept: FAMILY MEDICINE | Facility: CLINIC | Age: 52
End: 2022-12-15
Payer: MEDICAID

## 2022-12-15 DIAGNOSIS — K80.20 CALCULUS OF GALLBLADDER WITHOUT CHOLECYSTITIS WITHOUT OBSTRUCTION: Primary | ICD-10-CM

## 2022-12-15 NOTE — TELEPHONE ENCOUNTER
----- Message from Adrienne De Santiago sent at 12/15/2022 12:39 PM CST -----  Regarding: Results  Pt came in stating that he did some blood work and test awhile back. He has not heard from someone since then with results. He said he has tried to call and left messages for someone to call him.

## 2022-12-15 NOTE — TELEPHONE ENCOUNTER
----- Message from Lashae Oliveira sent at 12/15/2022 12:44 PM CST -----  Contact: pt  Type: Test Results    Who Called: pt  Name of Test: (labs, xray etc..) labs, u/s  Date of Test: 12/06/2022  Ordering Provider: Catrachita  Where the test performed: [facility]  Best Call Back Number: [phone]      Additional Information-Please advise caller/pt-Thank you~

## 2022-12-15 NOTE — TELEPHONE ENCOUNTER
Contacted patient in regards of lab results, verified . Patient verbalizes understanding and denies any further questions or concerns.     Pt wishes to have the referral has he does have pain that comes goes in RUQ

## 2022-12-28 ENCOUNTER — TELEPHONE (OUTPATIENT)
Dept: FAMILY MEDICINE | Facility: CLINIC | Age: 52
End: 2022-12-28
Payer: MEDICAID

## 2022-12-28 RX ORDER — AZITHROMYCIN 250 MG/1
TABLET, FILM COATED ORAL
Qty: 6 TABLET | Refills: 0 | Status: SHIPPED | OUTPATIENT
Start: 2022-12-28 | End: 2023-01-02

## 2022-12-28 NOTE — TELEPHONE ENCOUNTER
----- Message from Gaby Gomez MA sent at 12/28/2022  4:26 PM CST -----  Regarding: FW: refill  Contact: patient  Pt came from vacation and not feeling well. Please advise.  ----- Message -----  From: Amado Hernandez  Sent: 12/28/2022   1:00 PM CST  To: Catr[facility]ita [facility] Staff  Subject: refill                                           Type:  RX Refill Request    Who Called:  Patient   Refill or New Rx:  Refill  RX Name and Strength:  Zpack  How is the patient currently taking it? (ex. 1XDay):  as needed  Is this a 30 day or 90 day RX:  30day  Preferred Pharmacy with phone number:    [facility] [place] - [address]  [address]  [address] [address]  Phone: [phone] Fax: [phone]     Local or Mail Order:  Local  Ordering Provider:  Dr Domínguez  Best Call Back Number:  [phone] (home)     Case number [id number]

## 2023-01-09 ENCOUNTER — OFFICE VISIT (OUTPATIENT)
Dept: SURGERY | Facility: CLINIC | Age: 53
End: 2023-01-09
Payer: MEDICAID

## 2023-01-09 VITALS
OXYGEN SATURATION: 96 % | HEIGHT: 73 IN | HEART RATE: 81 BPM | WEIGHT: 236 LBS | SYSTOLIC BLOOD PRESSURE: 102 MMHG | BODY MASS INDEX: 31.28 KG/M2 | DIASTOLIC BLOOD PRESSURE: 58 MMHG

## 2023-01-09 DIAGNOSIS — K80.20 CALCULUS OF GALLBLADDER WITHOUT CHOLECYSTITIS WITHOUT OBSTRUCTION: Primary | ICD-10-CM

## 2023-01-09 PROCEDURE — 3074F SYST BP LT 130 MM HG: CPT | Mod: CPTII,,, | Performed by: STUDENT IN AN ORGANIZED HEALTH CARE EDUCATION/TRAINING PROGRAM

## 2023-01-09 PROCEDURE — 3008F BODY MASS INDEX DOCD: CPT | Mod: CPTII,,, | Performed by: STUDENT IN AN ORGANIZED HEALTH CARE EDUCATION/TRAINING PROGRAM

## 2023-01-09 PROCEDURE — 99999 PR PBB SHADOW E&M-EST. PATIENT-LVL IV: ICD-10-PCS | Mod: PBBFAC,,, | Performed by: STUDENT IN AN ORGANIZED HEALTH CARE EDUCATION/TRAINING PROGRAM

## 2023-01-09 PROCEDURE — 99999 PR PBB SHADOW E&M-EST. PATIENT-LVL IV: CPT | Mod: PBBFAC,,, | Performed by: STUDENT IN AN ORGANIZED HEALTH CARE EDUCATION/TRAINING PROGRAM

## 2023-01-09 PROCEDURE — 3078F DIAST BP <80 MM HG: CPT | Mod: CPTII,,, | Performed by: STUDENT IN AN ORGANIZED HEALTH CARE EDUCATION/TRAINING PROGRAM

## 2023-01-09 PROCEDURE — 3078F PR MOST RECENT DIASTOLIC BLOOD PRESSURE < 80 MM HG: ICD-10-PCS | Mod: CPTII,,, | Performed by: STUDENT IN AN ORGANIZED HEALTH CARE EDUCATION/TRAINING PROGRAM

## 2023-01-09 PROCEDURE — 99214 OFFICE O/P EST MOD 30 MIN: CPT | Mod: PBBFAC | Performed by: STUDENT IN AN ORGANIZED HEALTH CARE EDUCATION/TRAINING PROGRAM

## 2023-01-09 PROCEDURE — 3074F PR MOST RECENT SYSTOLIC BLOOD PRESSURE < 130 MM HG: ICD-10-PCS | Mod: CPTII,,, | Performed by: STUDENT IN AN ORGANIZED HEALTH CARE EDUCATION/TRAINING PROGRAM

## 2023-01-09 PROCEDURE — 3008F PR BODY MASS INDEX (BMI) DOCUMENTED: ICD-10-PCS | Mod: CPTII,,, | Performed by: STUDENT IN AN ORGANIZED HEALTH CARE EDUCATION/TRAINING PROGRAM

## 2023-01-09 PROCEDURE — 1159F PR MEDICATION LIST DOCUMENTED IN MEDICAL RECORD: ICD-10-PCS | Mod: CPTII,,, | Performed by: STUDENT IN AN ORGANIZED HEALTH CARE EDUCATION/TRAINING PROGRAM

## 2023-01-09 PROCEDURE — 99203 PR OFFICE/OUTPT VISIT, NEW, LEVL III, 30-44 MIN: ICD-10-PCS | Mod: S$PBB,,, | Performed by: STUDENT IN AN ORGANIZED HEALTH CARE EDUCATION/TRAINING PROGRAM

## 2023-01-09 PROCEDURE — 99203 OFFICE O/P NEW LOW 30 MIN: CPT | Mod: S$PBB,,, | Performed by: STUDENT IN AN ORGANIZED HEALTH CARE EDUCATION/TRAINING PROGRAM

## 2023-01-09 PROCEDURE — 1159F MED LIST DOCD IN RCRD: CPT | Mod: CPTII,,, | Performed by: STUDENT IN AN ORGANIZED HEALTH CARE EDUCATION/TRAINING PROGRAM

## 2023-01-09 NOTE — NURSING
Low Fat Diet instructions given to patient. Gallbladder Removal By Laparoscopy pamphlet given to patient.

## 2023-01-09 NOTE — PROGRESS NOTES
[facility] Surgery H&P Note    Subjective:       Patient ID: Vincent Bales is a 52 y.o. male.    Chief Complaint: gallstones  HPI:  Vincent Bales is a 52 y.o. male with history of degenerative disc disease, hyperlipidemia, hypertension presents today for for gallstones.  Gallstones recently found on ultrasound that was performed of the abdomen to evaluate for fatty liver.  He was found to have a 2.2 cm stone in the gallbladder.  Liver otherwise normal.  LFTs normal.  Patient reports that for the past few months he has been experiencing right upper quadrant abdominal pain.  Typically occurs after eating.  He has not been able to identify any particular food that triggers the symptoms.  He denies nausea or vomiting.  He does endorse heartburn.  He denies change in bowel habits.    Past Medical History:   Diagnosis Date    DDD (degenerative disc disease), thoracolumbar     Hyperlipidemia     Hypertension      No past surgical history on file.  Family History   Problem Relation Age of Onset    Hypertension Mother     Diabetes Maternal Grandmother      Social History     Socioeconomic History    Marital status: Single   Tobacco Use    Smoking status: Never    Smokeless tobacco: Never   Substance and Sexual Activity    Alcohol use: Never    Drug use: Never    Sexual activity: Not Currently       Current Outpatient Medications   Medication Sig Dispense Refill    amLODIPine (NORVASC) 5 MG tablet Take 1 tablet (5 mg total) by mouth once daily. 90 tablet 3    benzonatate (TESSALON) 100 MG capsule TAKE 2 CAPSULES (200 MG TOTAL) BY MOUTH 3 (THREE) TIMES DAILY AS NEEDED FOR COUGH. 90 capsule 0    carisoprodoL (SOMA) 350 MG tablet Take 1 tablet (350 mg total) by mouth 2 (two) times daily as needed. 60 tablet 2    citalopram (CELEXA) 20 MG tablet       clonazePAM (KLONOPIN) 1 MG tablet Take 1 tablet (1 mg total) by mouth 2 (two) times daily. 60 tablet 0    ezetimibe (ZETIA) 10 mg tablet       fluticasone propionate  (FLONASE) 50 mcg/actuation nasal spray INSTILL 1 SPRAY IN EACH NOSTRIL TWICE A DAY 16 g 1    isosorbide mononitrate (IMDUR) 30 MG 24 hr tablet TAKE 1 TABLET BY MOUTH DAILY 30 tablet 3    metoprolol succinate (TOPROL-XL) 25 MG 24 hr tablet Take 1 tablet (25 mg total) by mouth once daily. 90 tablet 3    nabumetone (RELAFEN) 500 MG tablet Take 1 tablet (500 mg total) by mouth 2 (two) times daily as needed for Pain. 60 tablet 2    QUEtiapine (SEROQUEL) 200 MG Tab Take 1 tablet (200 mg total) by mouth every evening. 90 tablet 1    rosuvastatin (CRESTOR) 10 MG tablet Take 1 tablet (10 mg total) by mouth every evening. 90 tablet 3    pantoprazole (PROTONIX) 40 MG tablet Take 1 tablet (40 mg total) by mouth once daily. 90 tablet 3     No current facility-administered medications for this visit.     Review of patient's allergies indicates:   Allergen Reactions    Haloperidol      Other reaction(s): Other (See Comments)  CRAZY       Review of Systems   Constitutional:  Negative for appetite change, chills and fever.   HENT:  Negative for congestion, dental problem and drooling.    Eyes:  Negative for photophobia, discharge and itching.   Respiratory:  Negative for apnea and chest tightness.    Cardiovascular:  Negative for chest pain, palpitations and leg swelling.   Gastrointestinal:  Positive for abdominal pain. Negative for abdominal distention.   Endocrine: Negative for cold intolerance and heat intolerance.   Genitourinary:  Negative for difficulty urinating and dysuria.   Musculoskeletal:  Positive for back pain. Negative for arthralgias.   Skin:  Negative for color change and pallor.   Neurological:  Negative for dizziness, facial asymmetry and headaches.   Hematological:  Negative for adenopathy. Does not bruise/bleed easily.   Psychiatric/Behavioral:  Negative for agitation, behavioral problems and confusion.      Objective:      Vitals:    01/09/23 1130   BP: (!) 102/58   Pulse: 81   SpO2: 96%   Weight: 107 kg (236  "lb)   Height: 6' 1" (1.854 m)     Physical Exam  Constitutional:       Appearance: He is well-developed.   HENT:      Head: Normocephalic and atraumatic.   Eyes:      Pupils: Pupils are equal, round, and reactive to light.   Cardiovascular:      Rate and Rhythm: Normal rate and regular rhythm.   Pulmonary:      Effort: Pulmonary effort is normal.      Breath sounds: Normal breath sounds.   Abdominal:      General: Bowel sounds are normal. There is no distension.      Palpations: Abdomen is soft.      Tenderness: There is no abdominal tenderness.   Musculoskeletal:         General: Normal range of motion.      Cervical back: Normal range of motion and neck supple.   Skin:     General: Skin is warm.      Findings: No erythema.   Neurological:      Mental Status: He is alert and oriented to person, place, and time.   Psychiatric:         Behavior: Behavior normal.       Lab Review: CBC:   Lab Results   Component Value Date    WBC 5.20 12/06/2022    RBC 4.50 (L) 12/06/2022    HGB 14.2 12/06/2022    HCT 41.3 12/06/2022     12/06/2022     BMP:   Lab Results   Component Value Date     (H) 12/06/2022     12/06/2022    K 4.0 12/06/2022     12/06/2022    CO2 24 12/06/2022    BUN 6 12/06/2022    CREATININE 0.8 12/06/2022    CALCIUM 9.1 12/06/2022     Lab Results   Component Value Date    ALT 29 12/06/2022    AST 24 12/06/2022    ALKPHOS 56 12/06/2022    BILITOT 0.4 12/06/2022       Diagnostics Review: US: Reviewed     Assessment:       1. Calculus of gallbladder without cholecystitis without obstruction          Plan:   Calculus of gallbladder without cholecystitis without obstruction  -     Ambulatory referral/consult to General Surgery        Medical Decision Making/Counseling:  I have reviewed the patient's history physical examination as well as the patient's laboratory and radiologic studies.  He has what appears to be symptomatic cholelithiasis.  Postprandial pain which is colicky in nature.  " Patient on ultrasound shows cholelithiasis, no evidence of cholecystitis.  I believe given the patient's symptomatology he would benefit from an elective laparoscopic versus open cholecystectomy.  Risks of cholecystectomy were discussed in detail.  I discussed that there is a 1 and 200 chance (0.5%) common bile duct injury.  I discussed the common bile duct is the drainage tube of the liver.  I discussed and such patients with cholecystectomy which results in injury of the common bile duct, a larger surgery is required called a hepaticojejunostomy.  I further discussed the risk of conversion to an open operation.  I discussed that 100% of the time I start cholecystectomies laparoscopically in the elective setting, but only 95% of the time am I able to complete the cholecystectomy laparoscopically.  I discussed that 5% of the time in open (larger incision) surgery is required for the safety of the patient.  I discussed the reasons for conversion to an open operation (how cholecystectomies use to be always removed) included significant scarring, adhesions, bleeding, or concern for injury of the surrounding structures which needed repair etc.  I also discussed with the patient that the intrinsic risks of surgery include bleeding, infection, need for further surgeries, admission to the hospital, in the intrinsic risks of anesthesia for which the patient will have a discussion on the surgical date with the anesthesiologist about.  Patient would like to try a low-fat diet first to see if this helps his symptoms. He understands the gallstones will not go away. He understands he is at risk of gallstones getting stuck in the bile duct and causing infection which could lead to hospitalization and urgent surgery. He will RTC in 2 months or sooner if symptoms worsen or fail to improve.      Patient instructed that best way to communicate with my office staff is for patient to get on the [facility] patient portal to expedite  communication and communication issues that may occur.  Patient was given instructions on how to get on the portal.  I encouraged patient to obtain portal access as well.  Ultimately it is up to the patient to obtain access.  Patient voiced understanding.

## 2023-02-07 ENCOUNTER — OFFICE VISIT (OUTPATIENT)
Dept: FAMILY MEDICINE | Facility: CLINIC | Age: 53
End: 2023-02-07
Payer: MEDICAID

## 2023-02-07 VITALS
OXYGEN SATURATION: 97 % | BODY MASS INDEX: 31.17 KG/M2 | WEIGHT: 235.19 LBS | SYSTOLIC BLOOD PRESSURE: 130 MMHG | TEMPERATURE: 98 F | HEIGHT: 73 IN | RESPIRATION RATE: 16 BRPM | HEART RATE: 77 BPM | DIASTOLIC BLOOD PRESSURE: 82 MMHG

## 2023-02-07 DIAGNOSIS — M25.521 RIGHT ELBOW PAIN: ICD-10-CM

## 2023-02-07 DIAGNOSIS — R06.02 SHORTNESS OF BREATH: Primary | ICD-10-CM

## 2023-02-07 DIAGNOSIS — K80.20 CALCULUS OF GALLBLADDER WITHOUT CHOLECYSTITIS WITHOUT OBSTRUCTION: ICD-10-CM

## 2023-02-07 PROCEDURE — 3079F DIAST BP 80-89 MM HG: CPT | Mod: CPTII,,, | Performed by: FAMILY MEDICINE

## 2023-02-07 PROCEDURE — 1159F MED LIST DOCD IN RCRD: CPT | Mod: CPTII,,, | Performed by: FAMILY MEDICINE

## 2023-02-07 PROCEDURE — 99215 OFFICE O/P EST HI 40 MIN: CPT | Mod: PBBFAC | Performed by: FAMILY MEDICINE

## 2023-02-07 PROCEDURE — 99214 PR OFFICE/OUTPT VISIT, EST, LEVL IV, 30-39 MIN: ICD-10-PCS | Mod: S$PBB,,, | Performed by: FAMILY MEDICINE

## 2023-02-07 PROCEDURE — 3075F PR MOST RECENT SYSTOLIC BLOOD PRESS GE 130-139MM HG: ICD-10-PCS | Mod: CPTII,,, | Performed by: FAMILY MEDICINE

## 2023-02-07 PROCEDURE — 99999 PR PBB SHADOW E&M-EST. PATIENT-LVL V: CPT | Mod: PBBFAC,,, | Performed by: FAMILY MEDICINE

## 2023-02-07 PROCEDURE — 1160F PR REVIEW ALL MEDS BY PRESCRIBER/CLIN PHARMACIST DOCUMENTED: ICD-10-PCS | Mod: CPTII,,, | Performed by: FAMILY MEDICINE

## 2023-02-07 PROCEDURE — 3008F BODY MASS INDEX DOCD: CPT | Mod: CPTII,,, | Performed by: FAMILY MEDICINE

## 2023-02-07 PROCEDURE — 99214 OFFICE O/P EST MOD 30 MIN: CPT | Mod: S$PBB,,, | Performed by: FAMILY MEDICINE

## 2023-02-07 PROCEDURE — 3008F PR BODY MASS INDEX (BMI) DOCUMENTED: ICD-10-PCS | Mod: CPTII,,, | Performed by: FAMILY MEDICINE

## 2023-02-07 PROCEDURE — 1159F PR MEDICATION LIST DOCUMENTED IN MEDICAL RECORD: ICD-10-PCS | Mod: CPTII,,, | Performed by: FAMILY MEDICINE

## 2023-02-07 PROCEDURE — 99999 PR PBB SHADOW E&M-EST. PATIENT-LVL V: ICD-10-PCS | Mod: PBBFAC,,, | Performed by: FAMILY MEDICINE

## 2023-02-07 PROCEDURE — 3075F SYST BP GE 130 - 139MM HG: CPT | Mod: CPTII,,, | Performed by: FAMILY MEDICINE

## 2023-02-07 PROCEDURE — 1160F RVW MEDS BY RX/DR IN RCRD: CPT | Mod: CPTII,,, | Performed by: FAMILY MEDICINE

## 2023-02-07 PROCEDURE — 3079F PR MOST RECENT DIASTOLIC BLOOD PRESSURE 80-89 MM HG: ICD-10-PCS | Mod: CPTII,,, | Performed by: FAMILY MEDICINE

## 2023-02-07 NOTE — PROGRESS NOTES
"[facility] - Clinic Note    Subjective      Mr. Baels is a 52 y.o. male who presents to clinic for Follow-up (3mth follow up/refills)      With regard to patient's degenerative disc disease it is stable. The Soma is helping.  He takes it when he needs it. Blood pressure is well controlled.  The Klonopin is helping with his anxiety.      PMH Vincent has a past medical history of DDD (degenerative disc disease), thoracolumbar, Hyperlipidemia, and Hypertension.   PSXH Vincent has no past surgical history on file.    Vincent's family history includes Diabetes in his maternal grandmother; Hypertension in his mother.   SH Vincent reports that he has never smoked. He has never used smokeless tobacco. He reports that he does not drink alcohol and does not use drugs.   ALG Vincent is allergic to haloperidol.   ALBERTINA Kaur has a current medication list which includes the following prescription(s): amlodipine, benzonatate, carisoprodol, citalopram, clonazepam, ezetimibe, fluticasone propionate, isosorbide mononitrate, metoprolol succinate, nabumetone, quetiapine, rosuvastatin, and pantoprazole.     Review of Systems   Constitutional:  Negative for chills and fever.   HENT:  Negative for congestion, dental problem and rhinorrhea.    Eyes:  Negative for visual disturbance.   Respiratory:  Negative for cough and shortness of breath.    Cardiovascular:  Negative for chest pain.   Gastrointestinal:  Negative for abdominal pain, constipation, diarrhea, nausea and vomiting.   Genitourinary:  Negative for dysuria.   Musculoskeletal:  Negative for myalgias.   Skin:  Negative for rash.   Neurological:  Negative for weakness and headaches.   Objective     /82 (BP Location: Right arm, Patient Position: Sitting, BP Method: Large (Manual))   Pulse 77   Temp 97.8 °F (36.6 °C) (Temporal)   Resp 16   Ht 6' 1" (1.854 m)   Wt 106.7 kg (235 lb 3.2 oz)   SpO2 97%   BMI 31.03 kg/m²     Physical Exam  Vitals and nursing note reviewed. "   Constitutional:       General: He is not in acute distress.     Appearance: Normal appearance. He is well-developed. He is not diaphoretic.   HENT:      Head: Normocephalic and atraumatic.      Right Ear: External ear normal.      Left Ear: External ear normal.   Eyes:      General:         Right eye: No discharge.         Left eye: No discharge.   Cardiovascular:      Rate and Rhythm: Normal rate and regular rhythm.      Heart sounds: Normal heart sounds.   Pulmonary:      Effort: Pulmonary effort is normal.      Breath sounds: Normal breath sounds. No wheezing or rales.   Skin:     General: Skin is warm and dry.   Neurological:      Mental Status: He is alert and oriented to person, place, and time. Mental status is at baseline.   Psychiatric:         Mood and Affect: Mood normal.         Behavior: Behavior normal.         Thought Content: Thought content normal.         Judgment: Judgment normal.      Assessment/Plan     1. Shortness of breath  Complete PFT w/ bronchodilator      2. Calculus of gallbladder without cholecystitis without obstruction        3. Right elbow pain  Ambulatory referral/consult to Orthopedics        Continue medication.  Long discussion about gallbladder and gallstone.  He will contact Dr. Aguirre about getting his gallbladder removed.  PFT for shortness of breath.  Referral to orthopedics for chronic elbow pain.    Future Appointments   Date Time Provider Department Center   3/13/2023 11:30 AM Janessa Aguirre MD [facility]         Kin Domínguez MD  Family Medicine  [facility]

## 2023-03-08 ENCOUNTER — OFFICE VISIT (OUTPATIENT)
Dept: SURGERY | Facility: CLINIC | Age: 53
End: 2023-03-08
Payer: MEDICAID

## 2023-03-08 VITALS
HEART RATE: 80 BPM | WEIGHT: 235 LBS | BODY MASS INDEX: 31.14 KG/M2 | DIASTOLIC BLOOD PRESSURE: 83 MMHG | SYSTOLIC BLOOD PRESSURE: 137 MMHG | OXYGEN SATURATION: 96 % | HEIGHT: 73 IN

## 2023-03-08 DIAGNOSIS — K80.20 CALCULUS OF GALLBLADDER WITHOUT CHOLECYSTITIS WITHOUT OBSTRUCTION: Primary | ICD-10-CM

## 2023-03-08 PROCEDURE — 99214 PR OFFICE/OUTPT VISIT, EST, LEVL IV, 30-39 MIN: ICD-10-PCS | Mod: S$PBB,,, | Performed by: STUDENT IN AN ORGANIZED HEALTH CARE EDUCATION/TRAINING PROGRAM

## 2023-03-08 PROCEDURE — 99215 OFFICE O/P EST HI 40 MIN: CPT | Mod: PBBFAC | Performed by: STUDENT IN AN ORGANIZED HEALTH CARE EDUCATION/TRAINING PROGRAM

## 2023-03-08 PROCEDURE — 3008F PR BODY MASS INDEX (BMI) DOCUMENTED: ICD-10-PCS | Mod: CPTII,,, | Performed by: STUDENT IN AN ORGANIZED HEALTH CARE EDUCATION/TRAINING PROGRAM

## 2023-03-08 PROCEDURE — 3079F DIAST BP 80-89 MM HG: CPT | Mod: CPTII,,, | Performed by: STUDENT IN AN ORGANIZED HEALTH CARE EDUCATION/TRAINING PROGRAM

## 2023-03-08 PROCEDURE — 3075F SYST BP GE 130 - 139MM HG: CPT | Mod: CPTII,,, | Performed by: STUDENT IN AN ORGANIZED HEALTH CARE EDUCATION/TRAINING PROGRAM

## 2023-03-08 PROCEDURE — 1159F PR MEDICATION LIST DOCUMENTED IN MEDICAL RECORD: ICD-10-PCS | Mod: CPTII,,, | Performed by: STUDENT IN AN ORGANIZED HEALTH CARE EDUCATION/TRAINING PROGRAM

## 2023-03-08 PROCEDURE — 99214 OFFICE O/P EST MOD 30 MIN: CPT | Mod: S$PBB,,, | Performed by: STUDENT IN AN ORGANIZED HEALTH CARE EDUCATION/TRAINING PROGRAM

## 2023-03-08 PROCEDURE — 99999 PR PBB SHADOW E&M-EST. PATIENT-LVL V: ICD-10-PCS | Mod: PBBFAC,,, | Performed by: STUDENT IN AN ORGANIZED HEALTH CARE EDUCATION/TRAINING PROGRAM

## 2023-03-08 PROCEDURE — 99999 PR PBB SHADOW E&M-EST. PATIENT-LVL V: CPT | Mod: PBBFAC,,, | Performed by: STUDENT IN AN ORGANIZED HEALTH CARE EDUCATION/TRAINING PROGRAM

## 2023-03-08 PROCEDURE — 3008F BODY MASS INDEX DOCD: CPT | Mod: CPTII,,, | Performed by: STUDENT IN AN ORGANIZED HEALTH CARE EDUCATION/TRAINING PROGRAM

## 2023-03-08 PROCEDURE — 1159F MED LIST DOCD IN RCRD: CPT | Mod: CPTII,,, | Performed by: STUDENT IN AN ORGANIZED HEALTH CARE EDUCATION/TRAINING PROGRAM

## 2023-03-08 PROCEDURE — 3079F PR MOST RECENT DIASTOLIC BLOOD PRESSURE 80-89 MM HG: ICD-10-PCS | Mod: CPTII,,, | Performed by: STUDENT IN AN ORGANIZED HEALTH CARE EDUCATION/TRAINING PROGRAM

## 2023-03-08 PROCEDURE — 3075F PR MOST RECENT SYSTOLIC BLOOD PRESS GE 130-139MM HG: ICD-10-PCS | Mod: CPTII,,, | Performed by: STUDENT IN AN ORGANIZED HEALTH CARE EDUCATION/TRAINING PROGRAM

## 2023-03-08 RX ORDER — SODIUM CHLORIDE 9 MG/ML
INJECTION, SOLUTION INTRAVENOUS CONTINUOUS
Status: CANCELLED | OUTPATIENT
Start: 2023-03-08

## 2023-03-08 RX ORDER — OLANZAPINE 5 MG/1
5 TABLET ORAL NIGHTLY
COMMUNITY
Start: 2023-02-14 | End: 2023-04-19

## 2023-03-08 NOTE — H&P
[facility] Surgery H&P Note    Subjective:       Patient ID: Vincent Bales is a 52 y.o. male.    Chief Complaint: gallstones  HPI:  Vincent Bales is a 52 y.o. male with history of degenerative disc disease, hyperlipidemia, hypertension presents today for re-evaluation of gallstones.  Gallstones found on ultrasound that was performed of the abdomen to evaluate for fatty liver.  He was found to have a 2.2 cm stone in the gallbladder.  Liver otherwise normal.  LFTs normal.  Patient reports that for the past few months he has been experiencing right upper quadrant abdominal pain.  Typically occurs after eating.  He has not been able to identify any particular food that triggers the symptoms.  He denies nausea or vomiting.  He does endorse heartburn.  He denies change in bowel habits.  He tried a low-fat diet but was unsuccessful.    Past Medical History:   Diagnosis Date    DDD (degenerative disc disease), thoracolumbar     Hyperlipidemia     Hypertension      History reviewed. No pertinent surgical history.  Family History   Problem Relation Age of Onset    Hypertension Mother     Diabetes Maternal Grandmother      Social History     Socioeconomic History    Marital status: Single   Tobacco Use    Smoking status: Never    Smokeless tobacco: Never   Substance and Sexual Activity    Alcohol use: Never    Drug use: Never    Sexual activity: Not Currently       Current Outpatient Medications   Medication Sig Dispense Refill    amLODIPine (NORVASC) 5 MG tablet Take 1 tablet (5 mg total) by mouth once daily. 90 tablet 3    benzonatate (TESSALON) 100 MG capsule TAKE 2 CAPSULES (200 MG TOTAL) BY MOUTH 3 (THREE) TIMES DAILY AS NEEDED FOR COUGH. 90 capsule 2    carisoprodoL (SOMA) 350 MG tablet Take 1 tablet (350 mg total) by mouth 2 (two) times daily as needed. 60 tablet 2    citalopram (CELEXA) 20 MG tablet       clonazePAM (KLONOPIN) 1 MG tablet Take 1 tablet (1 mg total) by mouth 2 (two) times daily. 60 tablet 0     ezetimibe (ZETIA) 10 mg tablet       fluticasone propionate (FLONASE) 50 mcg/actuation nasal spray INSTILL 1 SPRAY IN EACH NOSTRIL TWICE A DAY 16 g 2    isosorbide mononitrate (IMDUR) 30 MG 24 hr tablet Take 1 tablet (30 mg total) by mouth once daily. 30 tablet 3    metoprolol succinate (TOPROL-XL) 25 MG 24 hr tablet Take 1 tablet (25 mg total) by mouth once daily. 90 tablet 3    nabumetone (RELAFEN) 500 MG tablet TAKE 1 TABLET (500 MG TOTAL) BY MOUTH 2 (TWO) TIMES DAILY AS NEEDED FOR PAIN. 60 tablet 2    QUEtiapine (SEROQUEL) 200 MG Tab Take 1 tablet (200 mg total) by mouth every evening. 90 tablet 1    rosuvastatin (CRESTOR) 10 MG tablet Take 1 tablet (10 mg total) by mouth every evening. 90 tablet 3    ZYPREXA 5 mg tablet Take 5 mg by mouth every evening.      pantoprazole (PROTONIX) 40 MG tablet Take 1 tablet (40 mg total) by mouth once daily. 90 tablet 3     Current Facility-Administered Medications   Medication Dose Route Frequency Provider Last Rate Last Admin    ceFOXItin (MEFOXIN) 2 g in dextrose 5 % (D5W) 50 mL IVPB  2 g Intravenous On Call Procedure Janessa Aguirre MD         Review of patient's allergies indicates:   Allergen Reactions    Haloperidol      Other reaction(s): Other (See Comments)  CRAZY       Review of Systems   Constitutional:  Negative for appetite change, chills and fever.   HENT:  Negative for congestion, dental problem and drooling.    Eyes:  Negative for photophobia, discharge and itching.   Respiratory:  Negative for apnea and chest tightness.    Cardiovascular:  Negative for chest pain, palpitations and leg swelling.   Gastrointestinal:  Positive for abdominal pain. Negative for abdominal distention.   Endocrine: Negative for cold intolerance and heat intolerance.   Genitourinary:  Negative for difficulty urinating and dysuria.   Musculoskeletal:  Positive for back pain. Negative for arthralgias.   Skin:  Negative for color change and pallor.   Neurological:  Negative for  "dizziness, facial asymmetry and headaches.   Hematological:  Negative for adenopathy. Does not bruise/bleed easily.   Psychiatric/Behavioral:  Negative for agitation, behavioral problems and confusion.      Objective:      Vitals:    03/08/23 0936   BP: 137/83   Pulse: 80   SpO2: 96%   Weight: 106.6 kg (235 lb)   Height: 6' 1" (1.854 m)     Physical Exam  Constitutional:       Appearance: He is well-developed.   HENT:      Head: Normocephalic and atraumatic.   Eyes:      Pupils: Pupils are equal, round, and reactive to light.   Cardiovascular:      Rate and Rhythm: Normal rate and regular rhythm.   Pulmonary:      Effort: Pulmonary effort is normal.      Breath sounds: Normal breath sounds.   Abdominal:      General: Bowel sounds are normal. There is no distension.      Palpations: Abdomen is soft.      Tenderness: There is no abdominal tenderness.   Musculoskeletal:         General: Normal range of motion.      Cervical back: Normal range of motion and neck supple.   Skin:     General: Skin is warm.      Findings: No erythema.   Neurological:      Mental Status: He is alert and oriented to person, place, and time.   Psychiatric:         Behavior: Behavior normal.       Lab Review: CBC:   Lab Results   Component Value Date    WBC 5.20 12/06/2022    RBC 4.50 (L) 12/06/2022    HGB 14.2 12/06/2022    HCT 41.3 12/06/2022     12/06/2022     BMP:   Lab Results   Component Value Date     (H) 12/06/2022     12/06/2022    K 4.0 12/06/2022     12/06/2022    CO2 24 12/06/2022    BUN 6 12/06/2022    CREATININE 0.8 12/06/2022    CALCIUM 9.1 12/06/2022     Lab Results   Component Value Date    ALT 29 12/06/2022    AST 24 12/06/2022    ALKPHOS 56 12/06/2022    BILITOT 0.4 12/06/2022       Diagnostics Review: US: Reviewed     Assessment:       1. Calculus of gallbladder without cholecystitis without obstruction          Plan:   Calculus of gallbladder without cholecystitis without obstruction  -     Full " code; Standing  -     Place in Outpatient; Standing  -     Case Request Operating Room: CHOLECYSTECTOMY-LAPAROSCOPIC  -     Vital signs; Standing  -     Insert peripheral IV; Standing  -     Verify beta-blocker dose taken within 24 hours if patient is prescribed beta-blocker; Standing  -     Height and weight; Standing  -     Intake and output; Standing  -     Verify discontinuation of antithrombotics; Standing  -     POCT glucose; Standing  -     Verify blood consent; Standing  -     Verify consent; Standing  -     Clip and Prep Abdomen Zyphoid to Pubis; Standing  -     Chlorhexidine (CHG) 2% Wipes; Standing  -     Hibiclens shower; Standing  -     Hibiclens shower; Standing  -     Notify Physician; Standing  -     Diet NPO; Standing  -     Place SALAS hose; Standing  -     Place sequential compression device; Standing    Other orders  -     0.9%  NaCl infusion  -     IP VTE LOW RISK PATIENT; Standing  -     ceFOXItin (MEFOXIN) 2 g in dextrose 5 % (D5W) 50 mL IVPB      Medical Decision Making/Counseling:  I have reviewed the patient's history physical examination as well as the patient's laboratory and radiologic studies.  He has what appears to be symptomatic cholelithiasis.  Postprandial pain which is colicky in nature.  Patient on ultrasound shows cholelithiasis, no evidence of cholecystitis.  I believe given the patient's symptomatology he would benefit from an elective laparoscopic versus open cholecystectomy.  Risks of cholecystectomy were discussed in detail.  I discussed that there is a 1 and 200 chance (0.5%) common bile duct injury.  I discussed the common bile duct is the drainage tube of the liver.  I discussed and such patients with cholecystectomy which results in injury of the common bile duct, a larger surgery is required called a hepaticojejunostomy.  I further discussed the risk of conversion to an open operation.  I discussed that 100% of the time I start cholecystectomies laparoscopically in the  elective setting, but only 95% of the time am I able to complete the cholecystectomy laparoscopically.  I discussed that 5% of the time in open (larger incision) surgery is required for the safety of the patient.  I discussed the reasons for conversion to an open operation (how cholecystectomies use to be always removed) included significant scarring, adhesions, bleeding, or concern for injury of the surrounding structures which needed repair etc.  I also discussed with the patient that the intrinsic risks of surgery include bleeding, infection, need for further surgeries, admission to the hospital, in the intrinsic risks of anesthesia for which the patient will have a discussion on the surgical date with the anesthesiologist about.  He understands and agrees to proceed in the near future.      Patient instructed that best way to communicate with my office staff is for patient to get on the [facility] epic patient portal to expedite communication and communication issues that may occur.  Patient was given instructions on how to get on the portal.  I encouraged patient to obtain portal access as well.  Ultimately it is up to the patient to obtain access.  Patient voiced understanding.

## 2023-03-08 NOTE — H&P (VIEW-ONLY)
[facility] Surgery H&P Note    Subjective:       Patient ID: Vincent Bales is a 52 y.o. male.    Chief Complaint: gallstones  HPI:  Vincent Bales is a 52 y.o. male with history of degenerative disc disease, hyperlipidemia, hypertension presents today for re-evaluation of gallstones.  Gallstones found on ultrasound that was performed of the abdomen to evaluate for fatty liver.  He was found to have a 2.2 cm stone in the gallbladder.  Liver otherwise normal.  LFTs normal.  Patient reports that for the past few months he has been experiencing right upper quadrant abdominal pain.  Typically occurs after eating.  He has not been able to identify any particular food that triggers the symptoms.  He denies nausea or vomiting.  He does endorse heartburn.  He denies change in bowel habits.  He tried a low-fat diet but was unsuccessful.    Past Medical History:   Diagnosis Date    DDD (degenerative disc disease), thoracolumbar     Hyperlipidemia     Hypertension      History reviewed. No pertinent surgical history.  Family History   Problem Relation Age of Onset    Hypertension Mother     Diabetes Maternal Grandmother      Social History     Socioeconomic History    Marital status: Single   Tobacco Use    Smoking status: Never    Smokeless tobacco: Never   Substance and Sexual Activity    Alcohol use: Never    Drug use: Never    Sexual activity: Not Currently       Current Outpatient Medications   Medication Sig Dispense Refill    amLODIPine (NORVASC) 5 MG tablet Take 1 tablet (5 mg total) by mouth once daily. 90 tablet 3    benzonatate (TESSALON) 100 MG capsule TAKE 2 CAPSULES (200 MG TOTAL) BY MOUTH 3 (THREE) TIMES DAILY AS NEEDED FOR COUGH. 90 capsule 2    carisoprodoL (SOMA) 350 MG tablet Take 1 tablet (350 mg total) by mouth 2 (two) times daily as needed. 60 tablet 2    citalopram (CELEXA) 20 MG tablet       clonazePAM (KLONOPIN) 1 MG tablet Take 1 tablet (1 mg total) by mouth 2 (two) times daily. 60 tablet 0     ezetimibe (ZETIA) 10 mg tablet       fluticasone propionate (FLONASE) 50 mcg/actuation nasal spray INSTILL 1 SPRAY IN EACH NOSTRIL TWICE A DAY 16 g 2    isosorbide mononitrate (IMDUR) 30 MG 24 hr tablet Take 1 tablet (30 mg total) by mouth once daily. 30 tablet 3    metoprolol succinate (TOPROL-XL) 25 MG 24 hr tablet Take 1 tablet (25 mg total) by mouth once daily. 90 tablet 3    nabumetone (RELAFEN) 500 MG tablet TAKE 1 TABLET (500 MG TOTAL) BY MOUTH 2 (TWO) TIMES DAILY AS NEEDED FOR PAIN. 60 tablet 2    QUEtiapine (SEROQUEL) 200 MG Tab Take 1 tablet (200 mg total) by mouth every evening. 90 tablet 1    rosuvastatin (CRESTOR) 10 MG tablet Take 1 tablet (10 mg total) by mouth every evening. 90 tablet 3    ZYPREXA 5 mg tablet Take 5 mg by mouth every evening.      pantoprazole (PROTONIX) 40 MG tablet Take 1 tablet (40 mg total) by mouth once daily. 90 tablet 3     Current Facility-Administered Medications   Medication Dose Route Frequency Provider Last Rate Last Admin    ceFOXItin (MEFOXIN) 2 g in dextrose 5 % (D5W) 50 mL IVPB  2 g Intravenous On Call Procedure Janessa Aguirre MD         Review of patient's allergies indicates:   Allergen Reactions    Haloperidol      Other reaction(s): Other (See Comments)  CRAZY       Review of Systems   Constitutional:  Negative for appetite change, chills and fever.   HENT:  Negative for congestion, dental problem and drooling.    Eyes:  Negative for photophobia, discharge and itching.   Respiratory:  Negative for apnea and chest tightness.    Cardiovascular:  Negative for chest pain, palpitations and leg swelling.   Gastrointestinal:  Positive for abdominal pain. Negative for abdominal distention.   Endocrine: Negative for cold intolerance and heat intolerance.   Genitourinary:  Negative for difficulty urinating and dysuria.   Musculoskeletal:  Positive for back pain. Negative for arthralgias.   Skin:  Negative for color change and pallor.   Neurological:  Negative for  "dizziness, facial asymmetry and headaches.   Hematological:  Negative for adenopathy. Does not bruise/bleed easily.   Psychiatric/Behavioral:  Negative for agitation, behavioral problems and confusion.      Objective:      Vitals:    03/08/23 0936   BP: 137/83   Pulse: 80   SpO2: 96%   Weight: 106.6 kg (235 lb)   Height: 6' 1" (1.854 m)     Physical Exam  Constitutional:       Appearance: He is well-developed.   HENT:      Head: Normocephalic and atraumatic.   Eyes:      Pupils: Pupils are equal, round, and reactive to light.   Cardiovascular:      Rate and Rhythm: Normal rate and regular rhythm.   Pulmonary:      Effort: Pulmonary effort is normal.      Breath sounds: Normal breath sounds.   Abdominal:      General: Bowel sounds are normal. There is no distension.      Palpations: Abdomen is soft.      Tenderness: There is no abdominal tenderness.   Musculoskeletal:         General: Normal range of motion.      Cervical back: Normal range of motion and neck supple.   Skin:     General: Skin is warm.      Findings: No erythema.   Neurological:      Mental Status: He is alert and oriented to person, place, and time.   Psychiatric:         Behavior: Behavior normal.       Lab Review: CBC:   Lab Results   Component Value Date    WBC 5.20 12/06/2022    RBC 4.50 (L) 12/06/2022    HGB 14.2 12/06/2022    HCT 41.3 12/06/2022     12/06/2022     BMP:   Lab Results   Component Value Date     (H) 12/06/2022     12/06/2022    K 4.0 12/06/2022     12/06/2022    CO2 24 12/06/2022    BUN 6 12/06/2022    CREATININE 0.8 12/06/2022    CALCIUM 9.1 12/06/2022     Lab Results   Component Value Date    ALT 29 12/06/2022    AST 24 12/06/2022    ALKPHOS 56 12/06/2022    BILITOT 0.4 12/06/2022       Diagnostics Review: US: Reviewed     Assessment:       1. Calculus of gallbladder without cholecystitis without obstruction          Plan:   Calculus of gallbladder without cholecystitis without obstruction  -     Full " code; Standing  -     Place in Outpatient; Standing  -     Case Request Operating Room: CHOLECYSTECTOMY-LAPAROSCOPIC  -     Vital signs; Standing  -     Insert peripheral IV; Standing  -     Verify beta-blocker dose taken within 24 hours if patient is prescribed beta-blocker; Standing  -     Height and weight; Standing  -     Intake and output; Standing  -     Verify discontinuation of antithrombotics; Standing  -     POCT glucose; Standing  -     Verify blood consent; Standing  -     Verify consent; Standing  -     Clip and Prep Abdomen Zyphoid to Pubis; Standing  -     Chlorhexidine (CHG) 2% Wipes; Standing  -     Hibiclens shower; Standing  -     Hibiclens shower; Standing  -     Notify Physician; Standing  -     Diet NPO; Standing  -     Place SALAS hose; Standing  -     Place sequential compression device; Standing    Other orders  -     0.9%  NaCl infusion  -     IP VTE LOW RISK PATIENT; Standing  -     ceFOXItin (MEFOXIN) 2 g in dextrose 5 % (D5W) 50 mL IVPB      Medical Decision Making/Counseling:  I have reviewed the patient's history physical examination as well as the patient's laboratory and radiologic studies.  He has what appears to be symptomatic cholelithiasis.  Postprandial pain which is colicky in nature.  Patient on ultrasound shows cholelithiasis, no evidence of cholecystitis.  I believe given the patient's symptomatology he would benefit from an elective laparoscopic versus open cholecystectomy.  Risks of cholecystectomy were discussed in detail.  I discussed that there is a 1 and 200 chance (0.5%) common bile duct injury.  I discussed the common bile duct is the drainage tube of the liver.  I discussed and such patients with cholecystectomy which results in injury of the common bile duct, a larger surgery is required called a hepaticojejunostomy.  I further discussed the risk of conversion to an open operation.  I discussed that 100% of the time I start cholecystectomies laparoscopically in the  elective setting, but only 95% of the time am I able to complete the cholecystectomy laparoscopically.  I discussed that 5% of the time in open (larger incision) surgery is required for the safety of the patient.  I discussed the reasons for conversion to an open operation (how cholecystectomies use to be always removed) included significant scarring, adhesions, bleeding, or concern for injury of the surrounding structures which needed repair etc.  I also discussed with the patient that the intrinsic risks of surgery include bleeding, infection, need for further surgeries, admission to the hospital, in the intrinsic risks of anesthesia for which the patient will have a discussion on the surgical date with the anesthesiologist about.  He understands and agrees to proceed in the near future.      Patient instructed that best way to communicate with my office staff is for patient to get on the [facility] epic patient portal to expedite communication and communication issues that may occur.  Patient was given instructions on how to get on the portal.  I encouraged patient to obtain portal access as well.  Ultimately it is up to the patient to obtain access.  Patient voiced understanding.

## 2023-03-08 NOTE — PROGRESS NOTES
Patient scheduled for lap gabo surgical procedure on April 4, 2023. Pre-admit scheduled on March 28, 2023

## 2023-03-27 ENCOUNTER — TELEPHONE (OUTPATIENT)
Dept: SURGERY | Facility: CLINIC | Age: 53
End: 2023-03-27
Payer: MEDICAID

## 2023-03-27 NOTE — TELEPHONE ENCOUNTER
----- Message from Sarah Mcleod, Patient Care Assistant sent at 3/27/2023  7:48 AM CDT -----  Regarding: appointment  Contact: pt  Type: Needs Medical Advice    Who Called:  pt     Best Call Back Number: [phone] (home)     Additional Information: pt states he would like a callback regarding his procedure date. Please call pt to advise. Thanks!

## 2023-03-27 NOTE — TELEPHONE ENCOUNTER
Writer spoke to pt, went over the schedule of pre admit, procedure, and follow up dates with pt and locations of each. Pt asked about a new virus out and was instructed to contact his primary on the latest information. Pt informed to take blue folder and questions wrote down to preadmit. Pt verbalized understanding.

## 2023-03-28 ENCOUNTER — ANESTHESIA EVENT (OUTPATIENT)
Dept: SURGERY | Facility: HOSPITAL | Age: 53
End: 2023-03-28
Payer: MEDICAID

## 2023-03-28 ENCOUNTER — HOSPITAL ENCOUNTER (OUTPATIENT)
Dept: PREADMISSION TESTING | Facility: HOSPITAL | Age: 53
Discharge: HOME | End: 2023-03-28
Attending: FAMILY MEDICINE
Payer: MEDICAID

## 2023-03-28 PROCEDURE — 99900103 DSU ONLY-NO CHARGE-INITIAL HR (STAT)

## 2023-03-28 NOTE — ANESTHESIA PREPROCEDURE EVALUATION
03/28/2023  Vincent Bales is a 52 y.o., male.      Pre-op Assessment    I have reviewed the Patient Summary Reports.     I have reviewed the Nursing Notes. I have reviewed the NPO Status.   I have reviewed the Medications.     Review of Systems  Social:  Former Smoker    Hematology/Oncology:  Hematology Normal   Oncology Normal     EENT/Dental:EENT/Dental Normal   Cardiovascular:   Hypertension    Pulmonary:  Pulmonary Normal    Hepatic/GI:   GERD    Musculoskeletal:   Arthritis     Neurological:  Neurology Normal    Psych:   Psychiatric History (Bipolar, schizophrenia)          Physical Exam    Airway:  Mallampati: II   Mouth Opening: Normal  TM Distance: Normal  Tongue: Normal  Neck ROM: Normal ROM    Heart:  Rate: Normal  Rhythm: Regular Rhythm        Anesthesia Plan  Type of Anesthesia, risks & benefits discussed:    Anesthesia Type: Gen ETT  Intra-op Monitoring Plan: Standard ASA Monitors  Post Op Pain Control Plan: multimodal analgesia  Induction:  IV  Airway Plan: Direct  Informed Consent: Informed consent signed with the Patient and all parties understand the risks and agree with anesthesia plan.  All questions answered.   ASA Score: 2  Day of Surgery Review of History & Physical: H&P Update referred to the surgeon/provider.    Ready For Surgery From Anesthesia Perspective.     .

## 2023-04-04 ENCOUNTER — ANESTHESIA (OUTPATIENT)
Dept: SURGERY | Facility: HOSPITAL | Age: 53
End: 2023-04-04
Payer: MEDICAID

## 2023-04-04 ENCOUNTER — HOSPITAL ENCOUNTER (OUTPATIENT)
Facility: HOSPITAL | Age: 53
Discharge: HOME | End: 2023-04-04
Attending: STUDENT IN AN ORGANIZED HEALTH CARE EDUCATION/TRAINING PROGRAM | Admitting: STUDENT IN AN ORGANIZED HEALTH CARE EDUCATION/TRAINING PROGRAM
Payer: MEDICAID

## 2023-04-04 DIAGNOSIS — K80.20 CALCULUS OF GALLBLADDER WITHOUT CHOLECYSTITIS WITHOUT OBSTRUCTION: ICD-10-CM

## 2023-04-04 PROCEDURE — 37000009 HC ANESTHESIA EA ADD 15 MINS: Performed by: STUDENT IN AN ORGANIZED HEALTH CARE EDUCATION/TRAINING PROGRAM

## 2023-04-04 PROCEDURE — 63600175 PHARM REV CODE 636 W HCPCS: Performed by: NURSE ANESTHETIST, CERTIFIED REGISTERED

## 2023-04-04 PROCEDURE — 63600175 PHARM REV CODE 636 W HCPCS: Performed by: ANESTHESIOLOGY

## 2023-04-04 PROCEDURE — 36000709 HC OR TIME LEV III EA ADD 15 MIN: Performed by: STUDENT IN AN ORGANIZED HEALTH CARE EDUCATION/TRAINING PROGRAM

## 2023-04-04 PROCEDURE — 37000008 HC ANESTHESIA 1ST 15 MINUTES: Performed by: STUDENT IN AN ORGANIZED HEALTH CARE EDUCATION/TRAINING PROGRAM

## 2023-04-04 PROCEDURE — 25000003 PHARM REV CODE 250: Performed by: STUDENT IN AN ORGANIZED HEALTH CARE EDUCATION/TRAINING PROGRAM

## 2023-04-04 PROCEDURE — D9220A PRA ANESTHESIA: Mod: ANES,,, | Performed by: ANESTHESIOLOGY

## 2023-04-04 PROCEDURE — 88304 TISSUE EXAM BY PATHOLOGIST: CPT | Performed by: PATHOLOGY

## 2023-04-04 PROCEDURE — 71000033 HC RECOVERY, INTIAL HOUR: Performed by: STUDENT IN AN ORGANIZED HEALTH CARE EDUCATION/TRAINING PROGRAM

## 2023-04-04 PROCEDURE — D9220A PRA ANESTHESIA: ICD-10-PCS | Mod: CRNA,,, | Performed by: NURSE ANESTHETIST, CERTIFIED REGISTERED

## 2023-04-04 PROCEDURE — 00790 ANES IPER UPR ABD NOS: CPT | Performed by: STUDENT IN AN ORGANIZED HEALTH CARE EDUCATION/TRAINING PROGRAM

## 2023-04-04 PROCEDURE — 47562 LAPAROSCOPIC CHOLECYSTECTOMY: CPT | Mod: ,,, | Performed by: STUDENT IN AN ORGANIZED HEALTH CARE EDUCATION/TRAINING PROGRAM

## 2023-04-04 PROCEDURE — 88302 TISSUE EXAM BY PATHOLOGIST: CPT | Performed by: PATHOLOGY

## 2023-04-04 PROCEDURE — 63600175 PHARM REV CODE 636 W HCPCS: Performed by: STUDENT IN AN ORGANIZED HEALTH CARE EDUCATION/TRAINING PROGRAM

## 2023-04-04 PROCEDURE — 88302 TISSUE EXAM BY PATHOLOGIST: CPT | Mod: 26,,, | Performed by: PATHOLOGY

## 2023-04-04 PROCEDURE — 36000708 HC OR TIME LEV III 1ST 15 MIN: Performed by: STUDENT IN AN ORGANIZED HEALTH CARE EDUCATION/TRAINING PROGRAM

## 2023-04-04 PROCEDURE — 25000003 PHARM REV CODE 250

## 2023-04-04 PROCEDURE — 88304 PR  SURG PATH,LEVEL III: ICD-10-PCS | Mod: 26,,, | Performed by: PATHOLOGY

## 2023-04-04 PROCEDURE — 71000015 HC POSTOP RECOV 1ST HR: Performed by: STUDENT IN AN ORGANIZED HEALTH CARE EDUCATION/TRAINING PROGRAM

## 2023-04-04 PROCEDURE — 25000003 PHARM REV CODE 250: Performed by: NURSE ANESTHETIST, CERTIFIED REGISTERED

## 2023-04-04 PROCEDURE — 88302 PR  SURG PATH,LEVEL II: ICD-10-PCS | Mod: 26,,, | Performed by: PATHOLOGY

## 2023-04-04 PROCEDURE — 88304 TISSUE EXAM BY PATHOLOGIST: CPT | Mod: 26,,, | Performed by: PATHOLOGY

## 2023-04-04 PROCEDURE — D9220A PRA ANESTHESIA: ICD-10-PCS | Mod: ANES,,, | Performed by: ANESTHESIOLOGY

## 2023-04-04 PROCEDURE — 27201423 OPTIME MED/SURG SUP & DEVICES STERILE SUPPLY: Performed by: STUDENT IN AN ORGANIZED HEALTH CARE EDUCATION/TRAINING PROGRAM

## 2023-04-04 PROCEDURE — 47562 PR LAP,CHOLECYSTECTOMY: ICD-10-PCS | Mod: ,,, | Performed by: STUDENT IN AN ORGANIZED HEALTH CARE EDUCATION/TRAINING PROGRAM

## 2023-04-04 PROCEDURE — D9220A PRA ANESTHESIA: Mod: CRNA,,, | Performed by: NURSE ANESTHETIST, CERTIFIED REGISTERED

## 2023-04-04 PROCEDURE — 71000039 HC RECOVERY, EACH ADD'L HOUR: Performed by: STUDENT IN AN ORGANIZED HEALTH CARE EDUCATION/TRAINING PROGRAM

## 2023-04-04 RX ORDER — MORPHINE SULFATE 2 MG/ML
2 INJECTION, SOLUTION INTRAMUSCULAR; INTRAVENOUS EVERY 5 MIN PRN
Status: COMPLETED | OUTPATIENT
Start: 2023-04-04 | End: 2023-04-04

## 2023-04-04 RX ORDER — ONDANSETRON 2 MG/ML
INJECTION INTRAMUSCULAR; INTRAVENOUS
Status: DISCONTINUED | OUTPATIENT
Start: 2023-04-04 | End: 2023-04-04

## 2023-04-04 RX ORDER — SCOLOPAMINE TRANSDERMAL SYSTEM 1 MG/1
1 PATCH, EXTENDED RELEASE TRANSDERMAL
Status: DISCONTINUED | OUTPATIENT
Start: 2023-04-04 | End: 2023-04-04 | Stop reason: HOSPADM

## 2023-04-04 RX ORDER — ONDANSETRON 2 MG/ML
4 INJECTION INTRAMUSCULAR; INTRAVENOUS DAILY PRN
Status: DISCONTINUED | OUTPATIENT
Start: 2023-04-04 | End: 2023-11-14

## 2023-04-04 RX ORDER — KETOROLAC TROMETHAMINE 30 MG/ML
15 INJECTION, SOLUTION INTRAMUSCULAR; INTRAVENOUS ONCE
Status: COMPLETED | OUTPATIENT
Start: 2023-04-04 | End: 2023-04-04

## 2023-04-04 RX ORDER — LIDOCAINE HYDROCHLORIDE 10 MG/ML
1 INJECTION, SOLUTION EPIDURAL; INFILTRATION; INTRACAUDAL; PERINEURAL ONCE
Status: DISCONTINUED | OUTPATIENT
Start: 2023-04-04 | End: 2023-04-04 | Stop reason: HOSPADM

## 2023-04-04 RX ORDER — ROCURONIUM BROMIDE 10 MG/ML
INJECTION, SOLUTION INTRAVENOUS
Status: DISCONTINUED | OUTPATIENT
Start: 2023-04-04 | End: 2023-04-04

## 2023-04-04 RX ORDER — LIDOCAINE HYDROCHLORIDE 20 MG/ML
INJECTION, SOLUTION EPIDURAL; INFILTRATION; INTRACAUDAL; PERINEURAL
Status: DISCONTINUED | OUTPATIENT
Start: 2023-04-04 | End: 2023-04-04

## 2023-04-04 RX ORDER — LIDOCAINE HYDROCHLORIDE 10 MG/ML
1 INJECTION, SOLUTION EPIDURAL; INFILTRATION; INTRACAUDAL; PERINEURAL ONCE
Status: DISCONTINUED | OUTPATIENT
Start: 2023-04-04 | End: 2023-11-14

## 2023-04-04 RX ORDER — SODIUM CHLORIDE, SODIUM LACTATE, POTASSIUM CHLORIDE, CALCIUM CHLORIDE 600; 310; 30; 20 MG/100ML; MG/100ML; MG/100ML; MG/100ML
125 INJECTION, SOLUTION INTRAVENOUS CONTINUOUS
Status: DISCONTINUED | OUTPATIENT
Start: 2023-04-04 | End: 2023-04-04 | Stop reason: HOSPADM

## 2023-04-04 RX ORDER — FENTANYL CITRATE 50 UG/ML
INJECTION, SOLUTION INTRAMUSCULAR; INTRAVENOUS
Status: DISCONTINUED | OUTPATIENT
Start: 2023-04-04 | End: 2023-04-04

## 2023-04-04 RX ORDER — OXYCODONE AND ACETAMINOPHEN 5; 325 MG/1; MG/1
1 TABLET ORAL
Status: DISCONTINUED | OUTPATIENT
Start: 2023-04-04 | End: 2023-04-04 | Stop reason: HOSPADM

## 2023-04-04 RX ORDER — SODIUM CHLORIDE, SODIUM LACTATE, POTASSIUM CHLORIDE, CALCIUM CHLORIDE 600; 310; 30; 20 MG/100ML; MG/100ML; MG/100ML; MG/100ML
INJECTION, SOLUTION INTRAVENOUS CONTINUOUS
Status: DISCONTINUED | OUTPATIENT
Start: 2023-04-04 | End: 2023-04-04 | Stop reason: HOSPADM

## 2023-04-04 RX ORDER — ACETAMINOPHEN 10 MG/ML
INJECTION, SOLUTION INTRAVENOUS
Status: DISCONTINUED | OUTPATIENT
Start: 2023-04-04 | End: 2023-04-04

## 2023-04-04 RX ORDER — SCOLOPAMINE TRANSDERMAL SYSTEM 1 MG/1
PATCH, EXTENDED RELEASE TRANSDERMAL
Status: DISCONTINUED
Start: 2023-04-04 | End: 2023-04-04 | Stop reason: HOSPADM

## 2023-04-04 RX ORDER — BUPIVACAINE HYDROCHLORIDE AND EPINEPHRINE 5; 5 MG/ML; UG/ML
INJECTION, SOLUTION EPIDURAL; INTRACAUDAL; PERINEURAL
Status: DISCONTINUED | OUTPATIENT
Start: 2023-04-04 | End: 2023-04-04 | Stop reason: HOSPADM

## 2023-04-04 RX ORDER — MIDAZOLAM HYDROCHLORIDE 1 MG/ML
INJECTION INTRAMUSCULAR; INTRAVENOUS
Status: DISCONTINUED | OUTPATIENT
Start: 2023-04-04 | End: 2023-04-04

## 2023-04-04 RX ORDER — ONDANSETRON 2 MG/ML
4 INJECTION INTRAMUSCULAR; INTRAVENOUS DAILY PRN
Status: DISCONTINUED | OUTPATIENT
Start: 2023-04-04 | End: 2023-04-04 | Stop reason: HOSPADM

## 2023-04-04 RX ORDER — OXYCODONE AND ACETAMINOPHEN 5; 325 MG/1; MG/1
1 TABLET ORAL EVERY 6 HOURS PRN
Qty: 28 TABLET | Refills: 0 | Status: SHIPPED | OUTPATIENT
Start: 2023-04-04 | End: 2023-08-01

## 2023-04-04 RX ORDER — SODIUM CHLORIDE 9 MG/ML
INJECTION, SOLUTION INTRAVENOUS CONTINUOUS
Status: DISCONTINUED | OUTPATIENT
Start: 2023-04-04 | End: 2023-04-04 | Stop reason: HOSPADM

## 2023-04-04 RX ORDER — DEXAMETHASONE SODIUM PHOSPHATE 4 MG/ML
INJECTION, SOLUTION INTRA-ARTICULAR; INTRALESIONAL; INTRAMUSCULAR; INTRAVENOUS; SOFT TISSUE
Status: DISCONTINUED | OUTPATIENT
Start: 2023-04-04 | End: 2023-04-04

## 2023-04-04 RX ORDER — MORPHINE SULFATE 2 MG/ML
2 INJECTION, SOLUTION INTRAMUSCULAR; INTRAVENOUS EVERY 5 MIN PRN
Status: DISCONTINUED | OUTPATIENT
Start: 2023-04-04 | End: 2023-04-04 | Stop reason: HOSPADM

## 2023-04-04 RX ORDER — SODIUM CHLORIDE, SODIUM LACTATE, POTASSIUM CHLORIDE, CALCIUM CHLORIDE 600; 310; 30; 20 MG/100ML; MG/100ML; MG/100ML; MG/100ML
125 INJECTION, SOLUTION INTRAVENOUS CONTINUOUS
Status: DISCONTINUED | OUTPATIENT
Start: 2023-04-04 | End: 2023-11-14

## 2023-04-04 RX ORDER — PROPOFOL 10 MG/ML
VIAL (ML) INTRAVENOUS
Status: DISCONTINUED | OUTPATIENT
Start: 2023-04-04 | End: 2023-04-04

## 2023-04-04 RX ORDER — DIPHENHYDRAMINE HYDROCHLORIDE 50 MG/ML
12.5 INJECTION INTRAMUSCULAR; INTRAVENOUS
Status: DISCONTINUED | OUTPATIENT
Start: 2023-04-04 | End: 2023-11-14

## 2023-04-04 RX ORDER — SODIUM CHLORIDE, SODIUM LACTATE, POTASSIUM CHLORIDE, CALCIUM CHLORIDE 600; 310; 30; 20 MG/100ML; MG/100ML; MG/100ML; MG/100ML
INJECTION, SOLUTION INTRAVENOUS CONTINUOUS
Status: DISCONTINUED | OUTPATIENT
Start: 2023-04-04 | End: 2023-11-14

## 2023-04-04 RX ADMIN — ROCURONIUM BROMIDE 40 MG: 10 INJECTION, SOLUTION INTRAVENOUS at 11:04

## 2023-04-04 RX ADMIN — MORPHINE SULFATE 2 MG: 2 INJECTION, SOLUTION INTRAMUSCULAR; INTRAVENOUS at 01:04

## 2023-04-04 RX ADMIN — SUGAMMADEX 50 MG: 100 INJECTION, SOLUTION INTRAVENOUS at 12:04

## 2023-04-04 RX ADMIN — ACETAMINOPHEN 1000 MG: 10 INJECTION INTRAVENOUS at 11:04

## 2023-04-04 RX ADMIN — ROCURONIUM BROMIDE 10 MG: 10 INJECTION, SOLUTION INTRAVENOUS at 11:04

## 2023-04-04 RX ADMIN — SODIUM CHLORIDE, POTASSIUM CHLORIDE, SODIUM LACTATE AND CALCIUM CHLORIDE: 600; 310; 30; 20 INJECTION, SOLUTION INTRAVENOUS at 09:04

## 2023-04-04 RX ADMIN — MORPHINE SULFATE 2 MG: 2 INJECTION, SOLUTION INTRAMUSCULAR; INTRAVENOUS at 12:04

## 2023-04-04 RX ADMIN — ONDANSETRON 4 MG: 2 INJECTION INTRAMUSCULAR; INTRAVENOUS at 11:04

## 2023-04-04 RX ADMIN — SCOLOPAMINE TRANSDERMAL SYSTEM 1 PATCH: 1 PATCH, EXTENDED RELEASE TRANSDERMAL at 09:04

## 2023-04-04 RX ADMIN — GLYCOPYRROLATE 0.2 MG: 0.2 INJECTION INTRAMUSCULAR; INTRAVENOUS at 11:04

## 2023-04-04 RX ADMIN — PROPOFOL 200 MG: 10 INJECTION, EMULSION INTRAVENOUS at 11:04

## 2023-04-04 RX ADMIN — FENTANYL CITRATE 100 MCG: 50 INJECTION, SOLUTION INTRAMUSCULAR; INTRAVENOUS at 11:04

## 2023-04-04 RX ADMIN — SODIUM CHLORIDE, POTASSIUM CHLORIDE, SODIUM LACTATE AND CALCIUM CHLORIDE: 600; 310; 30; 20 INJECTION, SOLUTION INTRAVENOUS at 11:04

## 2023-04-04 RX ADMIN — LIDOCAINE HYDROCHLORIDE 100 MG: 20 INJECTION, SOLUTION EPIDURAL; INFILTRATION; INTRACAUDAL; PERINEURAL at 11:04

## 2023-04-04 RX ADMIN — CEFOXITIN 2 G: 2 INJECTION, POWDER, FOR SOLUTION INTRAVENOUS at 11:04

## 2023-04-04 RX ADMIN — MIDAZOLAM HYDROCHLORIDE 2 MG: 1 INJECTION, SOLUTION INTRAMUSCULAR; INTRAVENOUS at 11:04

## 2023-04-04 RX ADMIN — SUGAMMADEX 100 MG: 100 INJECTION, SOLUTION INTRAVENOUS at 12:04

## 2023-04-04 RX ADMIN — SCOPALAMINE 1 PATCH: 1 PATCH, EXTENDED RELEASE TRANSDERMAL at 09:04

## 2023-04-04 RX ADMIN — KETOROLAC TROMETHAMINE 15 MG: 30 INJECTION, SOLUTION INTRAMUSCULAR; INTRAVENOUS at 12:04

## 2023-04-04 RX ADMIN — DEXAMETHASONE SODIUM PHOSPHATE 4 MG: 4 INJECTION, SOLUTION INTRAMUSCULAR; INTRAVENOUS at 11:04

## 2023-04-04 NOTE — ANESTHESIA POSTPROCEDURE EVALUATION
Anesthesia Post Evaluation    Patient: Vincent Bales    Procedure(s) Performed: Procedure(s) (LRB):  CHOLECYSTECTOMY-LAPAROSCOPIC (Bilateral)  REPAIR, HERNIA, UMBILICAL (N/A)    Final Anesthesia Type: general      Patient location during evaluation: PACU  Patient participation: Yes- Able to Participate  Level of consciousness: awake and awake and alert  Post-procedure vital signs: reviewed and stable  Pain management: adequate  Airway patency: patent    PONV status at discharge: No PONV  Anesthetic complications: no      Cardiovascular status: blood pressure returned to baseline  Respiratory status: unassisted and spontaneous ventilation  Hydration status: euvolemic  Follow-up not needed.          Vitals Value Taken Time   /91 04/04/23 1331   Temp 36.6 °C (97.9 °F) 04/04/23 1230   Pulse 71 04/04/23 1344   Resp 16 04/04/23 1344   SpO2 96 % 04/04/23 1345   Vitals shown include unvalidated device data.      No case tracking events are documented in the log.      Pain/Paresh Score: Pain Rating Prior to Med Admin: 7 (4/4/2023  1:37 PM)  Paresh Score: 7 (4/4/2023 12:35 PM)

## 2023-04-04 NOTE — TRANSFER OF CARE
"Anesthesia Transfer of Care Note    Patient: Vincent Bales    Procedure(s) Performed: Procedure(s) (LRB):  CHOLECYSTECTOMY-LAPAROSCOPIC (Bilateral)  REPAIR, HERNIA, UMBILICAL (N/A)    Patient location: PACU    Anesthesia Type: general    Transport from OR: Transported from OR on room air with adequate spontaneous ventilation    Post pain: adequate analgesia    Post assessment: no apparent anesthetic complications and tolerated procedure well    Post vital signs: stable    Level of consciousness: sedated and responds to stimulation    Nausea/Vomiting: no nausea/vomiting    Complications: none    Transfer of care protocol was followed      Last vitals:   Visit Vitals  BP (!) 142/72 (BP Location: Left arm, Patient Position: Sitting)   Pulse 77   Temp 36.7 °C (98 °F) (Oral)   Resp 18   Ht 6' 1" (1.854 m)   Wt 106.6 kg (235 lb)   SpO2 98%   BMI 31.00 kg/m²     "

## 2023-04-04 NOTE — ANESTHESIA PROCEDURE NOTES
Intubation    Date/Time: 4/4/2023 11:15 AM  Performed by: Lyndsey Phipps CRNA  Authorized by: Juaquin Villalobos MD     Intubation:     Induction:  Intravenous    Intubated:  Postinduction    Mask Ventilation:  Easy mask    Attempts:  1    Attempted By:  CRNA    Method of Intubation:  Direct    Blade:  Martin 2    Laryngeal View Grade: Grade I - full view of cords      Difficult Airway Encountered?: No      Complications:  None    Airway Device:  Oral endotracheal tube    Airway Device Size:  7.0    Style/Cuff Inflation:  Cuffed (inflated to minimal occlusive pressure)    Tube secured:  23    Secured at:  The lips    Placement Verified By:  Capnometry    Complicating Factors:  None    Findings Post-Intubation:  BS equal bilateral and atraumatic/condition of teeth unchanged

## 2023-04-04 NOTE — DISCHARGE SUMMARY
[facility] - Surgery  Discharge Note  Short Stay    Procedure(s) (LRB):  CHOLECYSTECTOMY-LAPAROSCOPIC (Bilateral)  REPAIR, HERNIA, UMBILICAL (N/A)      OUTCOME: Patient tolerated treatment/procedure well without complication and is now ready for discharge.    DISPOSITION: Home or Self Care    FINAL DIAGNOSIS:  Calculus of gallbladder without cholecystitis without obstruction    FOLLOWUP: In clinic    DISCHARGE INSTRUCTIONS:    Discharge Procedure Orders   Diet general     Lifting restrictions   Order Comments: No heavy lifting, pushing, pulling, bending, strenuous exercise greater than 10 lb for the next 6 weeks.     Other restrictions (specify):   Order Comments: No swimming or submersion of wounds in lakes, ponds, streams, oceans, bathtubs, etc until seen in clinic for postoperative evaluation.     No dressing needed     Call MD for:  temperature >100.4     Call MD for:  persistent nausea and vomiting     Call MD for:  severe uncontrolled pain     Call MD for:  difficulty breathing, headache or visual disturbances     Call MD for:  redness, tenderness, or signs of infection (pain, swelling, redness, odor or green/yellow discharge around incision site)     Call MD for:  persistent dizziness or light-headedness        TIME SPENT ON DISCHARGE:    minutes

## 2023-04-04 NOTE — OP NOTE
[place] [facility]  General Surgery  Operative Note    SUMMARY     Date of Procedure: 4/4/2023     Procedure: Procedure(s) (LRB):  CHOLECYSTECTOMY-LAPAROSCOPIC (Bilateral)  REPAIR, HERNIA, UMBILICAL (N/A)       Surgeon(s) and Role:     * Janessa Aguirre MD - Primary    Assisting Surgeon: None    Pre-Operative Diagnosis: Calculus of gallbladder without cholecystitis without obstruction [K80.20]    Post-Operative Diagnosis: Post-Op Diagnosis Codes:     * Calculus of gallbladder without cholecystitis without obstruction [K80.20]    Anesthesia: General    Operative Findings (including complications, if any): 1cm umbilical hernia, dilated and distended gallbladder    Estimated Blood Loss (EBL): 3cc           Implants: * No implants in log *    Specimens:   Specimen (24h ago, onward)       Start     Ordered    04/04/23 1209  Specimen to Pathology, Surgery General Surgery  Once        Comments: Pre-op Diagnosis: Calculus of gallbladder without cholecystitis without obstruction [K80.20]Procedure(s):CHOLECYSTECTOMY-LAPAROSCOPICREPAIR, HERNIA, UMBILICAL Number of specimens: 2Name of specimens: 1) gallbladder 2) hernia sac     References:    Click here for ordering Quick Tip   Question Answer Comment   Procedure Type: General Surgery    Which provider would you like to cc? JANESSA AGUIRRE    Release to patient Immediate        04/04/23 1208                            Condition: Good    Disposition: PACU - hemodynamically stable.    Indication: Vincent Bales is a 52 y.o. male who presented to clinic with symptomatic cholelithiasis.    Procedure in detail: The patient was brought into the Operative Room and placed on the table in supine position.  General anesthesia was achieved uneventfully.  A timeout was then conducted with all in agreement.  The patient was then given Mefoxin for infection prophylaxis.  The abdomen was then prepped and draped in the standard sterile fashion.    Abdominal exam revealed an umbilical hernia.   I made a infraumbilical incision with an 11 blade scalpel. Incision was carried down to the fascia with Bovie electrocautery.  1cm fascial defect was encountered at umbilicus. Towel clips were used to elevate the umbilical stalk.  Two 0 Ethibond sutures used as stay sutures.  Claudette trocar was placed after a finger sweep was conducted, which confirmed no adhesions to the abdominal wall.  The abdomen was then insufflated to 15 mmHg through the Claudette trocar without bradycardia episodes of the patient.  The patient was then placed in a head up, left lateral position.    I then placed three additional trocars in the patient's right upper quadrant under direct visualization.  These were 5-mm trocars.  I then placed the assistant's port on the dome of the gallbladder and retracted it superiorly.  The gallbladder was dilated and distended.  At this point, I then placed my retractor on the infundibulum of the gallbladder and retracted it inferolaterally.  The cystic duct structures were then bluntly dissected out with the [place] retractor.  Cystic duct and cystic artery were visualized.  Cystic artery was behind the node of Calot.  The cystic duct and cystic artery were cleaned off and a critical view of safety was achieved with the liver seen behind both structures and in between both structures.  Two clips were placed proximally on both structures, one clip distally.  Both structures were transected distally.  The gallbladder was then retracted with adequate tension to allow for the Bovie electrocautery to remove the gallbladder from the liver bed.  The gallbladder was then dissected off the liver bed with bovie cautery and was placed in an EndoCatch bag.  EndoCatch bag was then removed through the Claudette trocar. The gallbladder was handed off for permanent section.  The liver was then retracted superiorly with a grasper.  The gallbladder bed was washed out copiously with  irrigant.  Hemostasis was achieved in the  gallbladder bed.  The clips placed previously on cystic duct and cystic artery were visualized again.  There was no hemorrhage from the artery.  There was no bile spillage from the cystic duct.  The patient was then placed in a back to normal position.  The right upper quadrant was irrigated out copiously with  irrigant until clear fluid returned.  Three 5-mm trocars were removed under direct visualization.  There was no hemorrhage from the port sites.  Insufflation was released.  The Claudette trocar was then removed.    Attention was then turned towards the umbilical defect.  0 Ethibond sutures were used in a interrupted fashion to close the fascia.  The two stay sutures previously placed were also tied down.  The fascia at this site was not patent to a finger.  3-0 Vicryl sutures were then used in a simple subdermal fashion to close the subdermis at all sites.  The skin was then run with a 4-0 Monocryl suture in running subcuticular fashion at the umbilical site.  Dermabond was placed over all four dressing.  The patient was then reversed from general anesthesia, extubated in the OR, transferred to the Postoperative Care Unit in stable condition.  All counts were correct at the end of the procedure including sponges, instruments, and needles.

## 2023-04-04 NOTE — PLAN OF CARE
Pt meets criteria for discharge. Pt transported to vehicle via w/c, and assisted into passenger side. No distress. Family has discharge paperwork.

## 2023-04-05 VITALS
TEMPERATURE: 98 F | HEIGHT: 73 IN | WEIGHT: 235 LBS | DIASTOLIC BLOOD PRESSURE: 84 MMHG | RESPIRATION RATE: 20 BRPM | BODY MASS INDEX: 31.14 KG/M2 | HEART RATE: 72 BPM | SYSTOLIC BLOOD PRESSURE: 128 MMHG | OXYGEN SATURATION: 96 %

## 2023-04-10 ENCOUNTER — TELEPHONE (OUTPATIENT)
Dept: SURGERY | Facility: CLINIC | Age: 53
End: 2023-04-10
Payer: MEDICAID

## 2023-04-10 NOTE — TELEPHONE ENCOUNTER
Writer spoke to pt, explained to pt that path report had not come back yet, everything still being processed. Pt stated he was doing better and seeing improvement. Pt stated he did not see any signs of infection. Follow up with Dr. Aguirre gone over with pt, pt informed to call us if he was having any concerns. Pt verbalized understanding    ----- Message from Allison Lujan sent at 4/10/2023  1:29 PM CDT -----  Regarding: advice  Contact: Patient  Type: Needs Medical Advice  Who Called:  Maria E   Symptoms (please be specific):    How long has patient had these symptoms:    Pharmacy name and phone #:    Best Call Back Number: [phone] (home)     Additional Information: Patient stated that he would like to speak with someone about the results after his surgery. Please contact patient to advise. Thanks!

## 2023-04-17 LAB
FINAL PATHOLOGIC DIAGNOSIS: NORMAL
GROSS: NORMAL
Lab: NORMAL

## 2023-04-19 ENCOUNTER — OFFICE VISIT (OUTPATIENT)
Dept: SURGERY | Facility: CLINIC | Age: 53
End: 2023-04-19
Payer: MEDICAID

## 2023-04-19 VITALS
WEIGHT: 242 LBS | SYSTOLIC BLOOD PRESSURE: 111 MMHG | RESPIRATION RATE: 18 BRPM | HEART RATE: 75 BPM | BODY MASS INDEX: 32.07 KG/M2 | HEIGHT: 73 IN | OXYGEN SATURATION: 97 % | DIASTOLIC BLOOD PRESSURE: 67 MMHG

## 2023-04-19 DIAGNOSIS — Z09 S/P UMBILICAL HERNIA REPAIR, FOLLOW-UP EXAM: ICD-10-CM

## 2023-04-19 DIAGNOSIS — Z90.49 S/P LAPAROSCOPIC CHOLECYSTECTOMY: Primary | ICD-10-CM

## 2023-04-19 PROCEDURE — 99024 PR POST-OP FOLLOW-UP VISIT: ICD-10-PCS | Mod: ,,, | Performed by: STUDENT IN AN ORGANIZED HEALTH CARE EDUCATION/TRAINING PROGRAM

## 2023-04-19 PROCEDURE — 3008F BODY MASS INDEX DOCD: CPT | Mod: CPTII,,, | Performed by: STUDENT IN AN ORGANIZED HEALTH CARE EDUCATION/TRAINING PROGRAM

## 2023-04-19 PROCEDURE — 3008F PR BODY MASS INDEX (BMI) DOCUMENTED: ICD-10-PCS | Mod: CPTII,,, | Performed by: STUDENT IN AN ORGANIZED HEALTH CARE EDUCATION/TRAINING PROGRAM

## 2023-04-19 PROCEDURE — 99999 PR PBB SHADOW E&M-EST. PATIENT-LVL IV: CPT | Mod: PBBFAC,,, | Performed by: STUDENT IN AN ORGANIZED HEALTH CARE EDUCATION/TRAINING PROGRAM

## 2023-04-19 PROCEDURE — 3074F PR MOST RECENT SYSTOLIC BLOOD PRESSURE < 130 MM HG: ICD-10-PCS | Mod: CPTII,,, | Performed by: STUDENT IN AN ORGANIZED HEALTH CARE EDUCATION/TRAINING PROGRAM

## 2023-04-19 PROCEDURE — 3078F PR MOST RECENT DIASTOLIC BLOOD PRESSURE < 80 MM HG: ICD-10-PCS | Mod: CPTII,,, | Performed by: STUDENT IN AN ORGANIZED HEALTH CARE EDUCATION/TRAINING PROGRAM

## 2023-04-19 PROCEDURE — 3074F SYST BP LT 130 MM HG: CPT | Mod: CPTII,,, | Performed by: STUDENT IN AN ORGANIZED HEALTH CARE EDUCATION/TRAINING PROGRAM

## 2023-04-19 PROCEDURE — 99999 PR PBB SHADOW E&M-EST. PATIENT-LVL IV: ICD-10-PCS | Mod: PBBFAC,,, | Performed by: STUDENT IN AN ORGANIZED HEALTH CARE EDUCATION/TRAINING PROGRAM

## 2023-04-19 PROCEDURE — 99214 OFFICE O/P EST MOD 30 MIN: CPT | Mod: PBBFAC | Performed by: STUDENT IN AN ORGANIZED HEALTH CARE EDUCATION/TRAINING PROGRAM

## 2023-04-19 PROCEDURE — 1159F PR MEDICATION LIST DOCUMENTED IN MEDICAL RECORD: ICD-10-PCS | Mod: CPTII,,, | Performed by: STUDENT IN AN ORGANIZED HEALTH CARE EDUCATION/TRAINING PROGRAM

## 2023-04-19 PROCEDURE — 99024 POSTOP FOLLOW-UP VISIT: CPT | Mod: ,,, | Performed by: STUDENT IN AN ORGANIZED HEALTH CARE EDUCATION/TRAINING PROGRAM

## 2023-04-19 PROCEDURE — 1159F MED LIST DOCD IN RCRD: CPT | Mod: CPTII,,, | Performed by: STUDENT IN AN ORGANIZED HEALTH CARE EDUCATION/TRAINING PROGRAM

## 2023-04-19 PROCEDURE — 3078F DIAST BP <80 MM HG: CPT | Mod: CPTII,,, | Performed by: STUDENT IN AN ORGANIZED HEALTH CARE EDUCATION/TRAINING PROGRAM

## 2023-04-19 RX ORDER — OLANZAPINE 7.5 MG/1
7.5 TABLET ORAL NIGHTLY
COMMUNITY
Start: 2023-04-14

## 2023-04-19 NOTE — PROGRESS NOTES
"General Surgery  [facility]  Follow-up    HPI/Follow-up exam:  Vincent Bales is a 52 y.o. male 2 weeks s/p lap gabo and umbilical hernia repair. He is doing well. Tolerating diet with no nausea/vomiting. Having bowel function. Minimal pain. He does admit that he forgets his activity restrictions and tries to lift heavy objects. This causes him to have pain.    PHYSICAL EXAM:  /67   Pulse 75   Resp 18   Ht 6' 1" (1.854 m)   Wt 109.8 kg (242 lb)   SpO2 97%   BMI 31.93 kg/m²   Incisions healing well no s/s of infection  No evidence of hernia recurrence    Pathology: chronic cholecystitis with cholelithiasis    Assessment:  Vincent Bales is a 52 y.o. male s/p umbilical hernia repair and cholecystectomy    Plan/Medical Decision Making:  Patient again instructed no heavy lifting >20lbs or straining for 6 weeks post op. 4 more weeks of activity restriction at this point. Patient advised hernia could recur if he does not abide by restrictions. He voiced understanding.    Follow up: PRN    Patient instructed that best way to communicate with my office staff is for patient to get on the [facility] [employer] patient portal to expedite communication and communication issues that may occur.  Patient was given instructions on how to get on the portal.  I encouraged patient to obtain portal access as well.  Ultimately it is up to the patient to obtain access.  Patient voiced understanding.          "

## 2023-05-11 DIAGNOSIS — M51.35 DDD (DEGENERATIVE DISC DISEASE), THORACOLUMBAR: ICD-10-CM

## 2023-05-11 RX ORDER — NABUMETONE 500 MG/1
500 TABLET, FILM COATED ORAL 2 TIMES DAILY PRN
Qty: 60 TABLET | Refills: 2 | Status: SHIPPED | OUTPATIENT
Start: 2023-05-11 | End: 2023-08-01

## 2023-05-11 NOTE — TELEPHONE ENCOUNTER
----- Message from Cheko Hankins sent at 5/11/2023  1:01 PM CDT -----  Regarding: Refill  Contact: Patient  Type:  RX Refill Request    Who Called: Patient  Refill or New Rx:Refill  RX Name and Strength:nabumetone (RELAFEN) 500 MG tablet AND clonazePAM (KLONOPIN) 1 MG tablet  How is the patient currently taking it? (ex. 1XDay):  Is this a 30 day or 90 day RX:90  Preferred Pharmacy with phone number:  [facility], [place] - [address]   [address]  [address] [address]  Phone: [phone] Fax: [phone]  Local or Mail Order:local  Ordering Provider:Catrachita  Would the patient rather a call back or a response via [facility]? call  Best Call Back Number:[phone]  Additional Information: Patient called regarding refill.

## 2023-05-15 ENCOUNTER — TELEPHONE (OUTPATIENT)
Dept: SURGERY | Facility: CLINIC | Age: 53
End: 2023-05-15
Payer: MEDICAID

## 2023-05-15 ENCOUNTER — TELEPHONE (OUTPATIENT)
Dept: FAMILY MEDICINE | Facility: CLINIC | Age: 53
End: 2023-05-15
Payer: MEDICAID

## 2023-05-15 ENCOUNTER — NURSE TRIAGE (OUTPATIENT)
Dept: ADMINISTRATIVE | Facility: CLINIC | Age: 53
End: 2023-05-15
Payer: MEDICAID

## 2023-05-15 NOTE — TELEPHONE ENCOUNTER
----- Message from Shelly Cedeño sent at 5/15/2023  4:23 PM CDT -----  Type: Needs Medical Advice  Who Called:  pt  Symptoms (please be specific):  aching side/ pain on side  Pharmacy name and phone #:    [facility] - [place], [place] - [address]  [address]  [address]  [place] [address]  Phone: [phone] Fax: [phone]  Best Call Back Number: [phone]  Additional Information: pt stated he is in pain and wants to be advised if it would be okay to get an xray. Please call back to advise asap, thanks!

## 2023-05-15 NOTE — TELEPHONE ENCOUNTER
Pt called with c/o pain in his left chest in the side by rib 3/4 he said that he has been having it for 3 days now and he recently had a Lap Marissa. Pt said that he is having trouble bending down and sometimes its difficult to catch his breath. Pt triaged and care advice is to go  To the Ed. Pt said ok. Pt told to call back if any other issues once he gets back home. Pt thought that he may have been doing to much              Reason for Disposition   Difficulty breathing    Additional Information   Negative: SEVERE difficulty breathing (e.g., struggling for each breath, speaks in single words)   Negative: Passed out (i.e., fainted, collapsed and was not responding)   Negative: Difficult to awaken or acting confused (e.g., disoriented, slurred speech)   Negative: Shock suspected (e.g., cold/pale/clammy skin, too weak to stand, low BP, rapid pulse)   Negative: Chest pain lasting longer than 5 minutes and ANY of the following:* Over 44 years old* Over 30 years old and at least one cardiac risk factor (e.g., diabetes mellitus, high blood pressure, high cholesterol, smoker, or strong family history of heart disease)* History of heart disease (i.e., angina, heart attack, heart failure, bypass surgery, takes nitroglycerin)* Pain is crushing, pressure-like, or heavy   Negative: Heart beating < 50 beats per minute OR > 140 beats per minute   Negative: Visible sweat on face or sweat dripping down face   Negative: Sounds like a life-threatening emergency to the triager   Negative: SEVERE chest pain   Negative: Pain also in shoulder(s) or arm(s) or jaw    Protocols used: Chest Pain-A-[place]

## 2023-05-15 NOTE — TELEPHONE ENCOUNTER
----- Message from Shelly Cedeño sent at 5/15/2023  4:23 PM CDT -----  Type: Needs Medical Advice  Who Called:  pt  Symptoms (please be specific):  aching side/ pain on side  Pharmacy name and phone #:    [facility] - [place], [place] - [address]  [address]  [address]  [place] [address]  Phone: [phone] Fax: [phone]  Best Call Back Number: [phone]  Additional Information: pt stated he is in pain and wants to be advised if it would be okay to get an xray. Please call back to advise asap, thanks!

## 2023-05-15 NOTE — TELEPHONE ENCOUNTER
Attempted to contact Vincent Bales to discuss  right side pain .     No voicemail left at this time.   Alia Cheung LPN

## 2023-05-22 ENCOUNTER — NURSE TRIAGE (OUTPATIENT)
Dept: ADMINISTRATIVE | Facility: CLINIC | Age: 53
End: 2023-05-22
Payer: MEDICAID

## 2023-05-22 NOTE — TELEPHONE ENCOUNTER
Pt calling for c/o abd pain on the left side under ribs and in the belly and was triaged the other day for pain in ribs and he was having muscle spasms given a muscle relaxant and now he is having abd pain. On the left. On a scale of 1-10 its an 8. Pt triaged to the ED the other day and he said that he had his gall bladder removed recently as well. Pt triaged back to the ED. He was given naprosyn but hasnt taken it since he said has trouble with them                 Reason for Disposition   SEVERE abdominal pain (e.g., excruciating)    Additional Information   Negative: Passed out (i.e., fainted, collapsed and was not responding)   Negative: Shock suspected (e.g., cold/pale/clammy skin, too weak to stand, low BP, rapid pulse)   Negative: Sounds like a life-threatening emergency to the triager    Protocols used: Abdominal Pain - Male-A-[place]

## 2023-06-05 ENCOUNTER — TELEPHONE (OUTPATIENT)
Dept: FAMILY MEDICINE | Facility: CLINIC | Age: 53
End: 2023-06-05
Payer: MEDICAID

## 2023-06-05 NOTE — TELEPHONE ENCOUNTER
"Per patient, " I received a letter stating the soma will be cut down. I do not take any other pain medication. I need Dr. Domínguez to inform me of what is going on as the letter has his name on it"   Please advise  "

## 2023-06-05 NOTE — TELEPHONE ENCOUNTER
----- Message from Caitie Martin sent at 6/5/2023  2:48 PM CDT -----  Contact: Patient  Type:  Needs Medical Advice    Who Called:   Patient    Pharmacy name and phone #:        [facility] - [place], [place] - [address]  [address]  [address]  [place] [address]  Phone: [phone] Fax: [phone]    Would the patient rather a call back or a response via [facility]?   Call back  Best Call Back Number:   [phone]    Additional Information: States he received information from his insurance regarding carisoprodoL (SOMA) 350 MG tablet - states he would like someone in the office to call him to explain - please call to advise - thank you

## 2023-06-15 DIAGNOSIS — M51.35 DDD (DEGENERATIVE DISC DISEASE), THORACOLUMBAR: ICD-10-CM

## 2023-06-15 NOTE — TELEPHONE ENCOUNTER
----- Message from Rg Wilson sent at 6/15/2023  4:56 PM CDT -----  Regarding: refill  Type:  RX Refill Request    Who Called:  Vincent Black or New Rx:  refill    RX Name and Strength:    carisoprodoL (SOMA) 350 MG tablet 60 tablet 2 2/27/2023    Sig - Route: Take 1 tablet (350 mg total) by mouth 2 (two) times daily as needed. - Oral   Sent to pharmacy as: carisoprodoL (SOMA) 350 MG tablet   E-Prescribing Status: Receipt confirmed by pharmacy (2/27/2023  8:39 AM CST)     How is the patient currently taking it? (ex. 1XDay):  see above  Is this a 30 day or 90 day RX:  see above    Preferred Pharmacy with phone number:    [facility], [place] - [address]  [address]  [facility] [address]  [facility] [address]  Phone: [phone] Fax: [phone]    Local or Mail Order:  local    Ordering Provider:  Dr Domínguez    Best Call Back Number:  [phone]    Additional Information:  pt is out of med.

## 2023-06-19 RX ORDER — CARISOPRODOL 350 MG/1
350 TABLET ORAL 2 TIMES DAILY PRN
Qty: 60 TABLET | Refills: 0 | Status: SHIPPED | OUTPATIENT
Start: 2023-06-19 | End: 2023-08-01 | Stop reason: SDUPTHER

## 2023-07-31 ENCOUNTER — PATIENT MESSAGE (OUTPATIENT)
Dept: FAMILY MEDICINE | Facility: CLINIC | Age: 53
End: 2023-07-31
Payer: MEDICAID

## 2023-07-31 DIAGNOSIS — M51.35 DDD (DEGENERATIVE DISC DISEASE), THORACOLUMBAR: ICD-10-CM

## 2023-07-31 DIAGNOSIS — F41.9 ANXIETY: ICD-10-CM

## 2023-07-31 RX ORDER — CLONAZEPAM 1 MG/1
1 TABLET ORAL 2 TIMES DAILY
Qty: 60 TABLET | Refills: 0 | OUTPATIENT
Start: 2023-07-31

## 2023-07-31 RX ORDER — CARISOPRODOL 350 MG/1
350 TABLET ORAL 2 TIMES DAILY PRN
Qty: 60 TABLET | Refills: 2 | OUTPATIENT
Start: 2023-07-31

## 2023-07-31 NOTE — TELEPHONE ENCOUNTER
----- Message from Frances CHETNA Pearl sent at 7/31/2023  1:48 PM CDT -----  Regarding: appointment  Contact: patient  Type:  Sameday Appointment Request    Caller is requesting a sameday appointment.  Caller declined first available appointment listed below.  Caller will not accept being placed on the waitlist and is requesting a message be sent to doctor.    Name of Caller:  patient  When is the first available appointment?  08/10/23  Symptoms:  medication check  Best Call Back Number:  [phone] (home)   Additional Information:  Patient was told he needs to have an appointment before he can get clonazePAM (KLONOPIN) 1 MG tablet, benzonatate (TESSALON) 100 MG capsule,  carisoprodoL (SOMA) 350 MG tablet. Patient is out of medication and would like to be seen today. Please call patient to advise.Thanks!

## 2023-07-31 NOTE — TELEPHONE ENCOUNTER
----- Message from Tessyanderson Herreraalysa sent at 7/31/2023  8:58 AM CDT -----  Regarding: RX Refill Request  Contact: patient at [phone]  Type:  RX Refill Request    Who Called:  patient at [phone]    Refill or New Rx:  refill  RX Name and Strength:    carisoprodoL (SOMA) 350 MG tablet 60 tablet 0 6/19/2023    Sig - Route: Take 1 tablet (350 mg total) by mouth 2 (two) times daily as needed. - Oral   Sent to pharmacy as: carisoprodoL (SOMA) 350 MG tablet   E-Prescribing Status: Receipt confirmed by pharmacy (6/19/2023  9:59 AM CDT)   No prior authorization was found for this prescription.   Found prior authorization for another prescription for the same medication: Closed     clonazePAM (KLONOPIN) 1 MG tablet 60 tablet 0 1/18/2022    Sig - Route: Take 1 tablet (1 mg total) by mouth 2 (two) times daily. - Oral   Sent to pharmacy as: clonazePAM (KLONOPIN) 1 MG tablet   E-Prescribing Status: Receipt confirmed by pharmacy (1/13/2022 11:27 AM CST)     benzonatate (TESSALON) 100 MG capsule 90 capsule 2 1/30/2023    Sig: TAKE 2 CAPSULES (200 MG TOTAL) BY MOUTH 3 (THREE) TIMES DAILY AS NEEDED FOR COUGH.   Sent to pharmacy as: benzonatate (TESSALON) 100 MG capsule   E-Prescribing Status: Receipt confirmed by pharmacy (1/30/2023  6:55 PM CST)     [facility] with phone number:    [facility], [place] - [address]   [address] [address]  Phone: [phone] Fax: [phone]    Additional Information:  patient is requesting refills. Please call and advise. Thank you

## 2023-08-01 ENCOUNTER — OFFICE VISIT (OUTPATIENT)
Dept: FAMILY MEDICINE | Facility: CLINIC | Age: 53
End: 2023-08-01
Payer: MEDICAID

## 2023-08-01 ENCOUNTER — LAB VISIT (OUTPATIENT)
Dept: LAB | Facility: HOSPITAL | Age: 53
End: 2023-08-01
Attending: FAMILY MEDICINE
Payer: MEDICAID

## 2023-08-01 VITALS
WEIGHT: 246.63 LBS | RESPIRATION RATE: 18 BRPM | BODY MASS INDEX: 32.69 KG/M2 | HEIGHT: 73 IN | DIASTOLIC BLOOD PRESSURE: 80 MMHG | OXYGEN SATURATION: 96 % | HEART RATE: 78 BPM | TEMPERATURE: 99 F | SYSTOLIC BLOOD PRESSURE: 126 MMHG

## 2023-08-01 DIAGNOSIS — M51.35 DDD (DEGENERATIVE DISC DISEASE), THORACOLUMBAR: ICD-10-CM

## 2023-08-01 DIAGNOSIS — K76.0 NAFLD (NONALCOHOLIC FATTY LIVER DISEASE): ICD-10-CM

## 2023-08-01 DIAGNOSIS — Z00.00 ANNUAL PHYSICAL EXAM: Primary | ICD-10-CM

## 2023-08-01 LAB
ALBUMIN SERPL BCP-MCNC: 4 G/DL (ref 3.5–5.2)
ALP SERPL-CCNC: 68 U/L (ref 55–135)
ALT SERPL W/O P-5'-P-CCNC: 34 U/L (ref 10–44)
AMPHET+METHAMPHET UR QL: NEGATIVE
ANION GAP SERPL CALC-SCNC: 10 MMOL/L (ref 8–16)
AST SERPL-CCNC: 32 U/L (ref 10–40)
BARBITURATES UR QL SCN>200 NG/ML: NEGATIVE
BASOPHILS # BLD AUTO: 0.05 K/UL (ref 0–0.2)
BASOPHILS NFR BLD: 0.9 % (ref 0–1.9)
BENZODIAZ UR QL SCN>200 NG/ML: NEGATIVE
BILIRUB SERPL-MCNC: 0.4 MG/DL (ref 0.1–1)
BUN SERPL-MCNC: 9 MG/DL (ref 6–20)
BZE UR QL SCN: NEGATIVE
CALCIUM SERPL-MCNC: 9.5 MG/DL (ref 8.7–10.5)
CANNABINOIDS UR QL SCN: NEGATIVE
CHLORIDE SERPL-SCNC: 105 MMOL/L (ref 95–110)
CHOLEST SERPL-MCNC: 209 MG/DL (ref 120–199)
CHOLEST/HDLC SERPL: 3.4 {RATIO} (ref 2–5)
CO2 SERPL-SCNC: 23 MMOL/L (ref 23–29)
CREAT SERPL-MCNC: 0.9 MG/DL (ref 0.5–1.4)
CREAT UR-MCNC: 52.5 MG/DL (ref 23–375)
DIFFERENTIAL METHOD: ABNORMAL
EOSINOPHIL # BLD AUTO: 0.1 K/UL (ref 0–0.5)
EOSINOPHIL NFR BLD: 1.8 % (ref 0–8)
ERYTHROCYTE [DISTWIDTH] IN BLOOD BY AUTOMATED COUNT: 13.3 % (ref 11.5–14.5)
EST. GFR  (NO RACE VARIABLE): >60 ML/MIN/1.73 M^2
ESTIMATED AVG GLUCOSE: 120 MG/DL (ref 68–131)
GLUCOSE SERPL-MCNC: 110 MG/DL (ref 70–110)
HBA1C MFR BLD: 5.8 % (ref 4–5.6)
HCT VFR BLD AUTO: 42.2 % (ref 40–54)
HDLC SERPL-MCNC: 62 MG/DL (ref 40–75)
HDLC SERPL: 29.7 % (ref 20–50)
HGB BLD-MCNC: 14.2 G/DL (ref 14–18)
IMM GRANULOCYTES # BLD AUTO: 0.01 K/UL (ref 0–0.04)
IMM GRANULOCYTES NFR BLD AUTO: 0.2 % (ref 0–0.5)
LDLC SERPL CALC-MCNC: 122.4 MG/DL (ref 63–159)
LYMPHOCYTES # BLD AUTO: 2.4 K/UL (ref 1–4.8)
LYMPHOCYTES NFR BLD: 41.8 % (ref 18–48)
MCH RBC QN AUTO: 31.3 PG (ref 27–31)
MCHC RBC AUTO-ENTMCNC: 33.6 G/DL (ref 32–36)
MCV RBC AUTO: 93 FL (ref 82–98)
METHADONE UR QL SCN>300 NG/ML: NEGATIVE
MONOCYTES # BLD AUTO: 0.7 K/UL (ref 0.3–1)
MONOCYTES NFR BLD: 11.4 % (ref 4–15)
NEUTROPHILS # BLD AUTO: 2.5 K/UL (ref 1.8–7.7)
NEUTROPHILS NFR BLD: 43.9 % (ref 38–73)
NONHDLC SERPL-MCNC: 147 MG/DL
NRBC BLD-RTO: 0 /100 WBC
OPIATES UR QL SCN: NEGATIVE
PCP UR QL SCN>25 NG/ML: NEGATIVE
PLATELET # BLD AUTO: 160 K/UL (ref 150–450)
PMV BLD AUTO: 10.9 FL (ref 9.2–12.9)
POTASSIUM SERPL-SCNC: 4 MMOL/L (ref 3.5–5.1)
PROT SERPL-MCNC: 7.6 G/DL (ref 6–8.4)
RBC # BLD AUTO: 4.54 M/UL (ref 4.6–6.2)
SODIUM SERPL-SCNC: 138 MMOL/L (ref 136–145)
TOXICOLOGY INFORMATION: NORMAL
TRIGL SERPL-MCNC: 123 MG/DL (ref 30–150)
WBC # BLD AUTO: 5.69 K/UL (ref 3.9–12.7)

## 2023-08-01 PROCEDURE — 36415 COLL VENOUS BLD VENIPUNCTURE: CPT | Performed by: FAMILY MEDICINE

## 2023-08-01 PROCEDURE — 83036 HEMOGLOBIN GLYCOSYLATED A1C: CPT | Performed by: FAMILY MEDICINE

## 2023-08-01 PROCEDURE — 80307 DRUG TEST PRSMV CHEM ANLYZR: CPT | Performed by: FAMILY MEDICINE

## 2023-08-01 PROCEDURE — 99999 PR PBB SHADOW E&M-EST. PATIENT-LVL IV: CPT | Mod: PBBFAC,,, | Performed by: FAMILY MEDICINE

## 2023-08-01 PROCEDURE — 3074F SYST BP LT 130 MM HG: CPT | Mod: CPTII,,, | Performed by: FAMILY MEDICINE

## 2023-08-01 PROCEDURE — 99214 OFFICE O/P EST MOD 30 MIN: CPT | Mod: PBBFAC | Performed by: FAMILY MEDICINE

## 2023-08-01 PROCEDURE — 3079F PR MOST RECENT DIASTOLIC BLOOD PRESSURE 80-89 MM HG: ICD-10-PCS | Mod: CPTII,,, | Performed by: FAMILY MEDICINE

## 2023-08-01 PROCEDURE — 1159F MED LIST DOCD IN RCRD: CPT | Mod: CPTII,,, | Performed by: FAMILY MEDICINE

## 2023-08-01 PROCEDURE — 3074F PR MOST RECENT SYSTOLIC BLOOD PRESSURE < 130 MM HG: ICD-10-PCS | Mod: CPTII,,, | Performed by: FAMILY MEDICINE

## 2023-08-01 PROCEDURE — 1160F RVW MEDS BY RX/DR IN RCRD: CPT | Mod: CPTII,,, | Performed by: FAMILY MEDICINE

## 2023-08-01 PROCEDURE — 80053 COMPREHEN METABOLIC PANEL: CPT | Performed by: FAMILY MEDICINE

## 2023-08-01 PROCEDURE — 3079F DIAST BP 80-89 MM HG: CPT | Mod: CPTII,,, | Performed by: FAMILY MEDICINE

## 2023-08-01 PROCEDURE — 3044F HG A1C LEVEL LT 7.0%: CPT | Mod: CPTII,,, | Performed by: FAMILY MEDICINE

## 2023-08-01 PROCEDURE — 99396 PREV VISIT EST AGE 40-64: CPT | Mod: S$PBB,,, | Performed by: FAMILY MEDICINE

## 2023-08-01 PROCEDURE — 99999 PR PBB SHADOW E&M-EST. PATIENT-LVL IV: ICD-10-PCS | Mod: PBBFAC,,, | Performed by: FAMILY MEDICINE

## 2023-08-01 PROCEDURE — 80061 LIPID PANEL: CPT | Performed by: FAMILY MEDICINE

## 2023-08-01 PROCEDURE — 99396 PR PREVENTIVE VISIT,EST,40-64: ICD-10-PCS | Mod: S$PBB,,, | Performed by: FAMILY MEDICINE

## 2023-08-01 PROCEDURE — 3044F PR MOST RECENT HEMOGLOBIN A1C LEVEL <7.0%: ICD-10-PCS | Mod: CPTII,,, | Performed by: FAMILY MEDICINE

## 2023-08-01 PROCEDURE — 85025 COMPLETE CBC W/AUTO DIFF WBC: CPT | Performed by: FAMILY MEDICINE

## 2023-08-01 PROCEDURE — 1160F PR REVIEW ALL MEDS BY PRESCRIBER/CLIN PHARMACIST DOCUMENTED: ICD-10-PCS | Mod: CPTII,,, | Performed by: FAMILY MEDICINE

## 2023-08-01 PROCEDURE — 1159F PR MEDICATION LIST DOCUMENTED IN MEDICAL RECORD: ICD-10-PCS | Mod: CPTII,,, | Performed by: FAMILY MEDICINE

## 2023-08-01 PROCEDURE — 3008F PR BODY MASS INDEX (BMI) DOCUMENTED: ICD-10-PCS | Mod: CPTII,,, | Performed by: FAMILY MEDICINE

## 2023-08-01 PROCEDURE — 3008F BODY MASS INDEX DOCD: CPT | Mod: CPTII,,, | Performed by: FAMILY MEDICINE

## 2023-08-01 RX ORDER — CARISOPRODOL 350 MG/1
350 TABLET ORAL 2 TIMES DAILY PRN
Qty: 60 TABLET | Refills: 0 | Status: SHIPPED | OUTPATIENT
Start: 2023-08-01 | End: 2023-09-08 | Stop reason: SDUPTHER

## 2023-08-01 RX ORDER — NABUMETONE 750 MG/1
750 TABLET, FILM COATED ORAL 2 TIMES DAILY PRN
Qty: 60 TABLET | Refills: 2 | Status: SHIPPED | OUTPATIENT
Start: 2023-08-01

## 2023-08-01 NOTE — PROGRESS NOTES
[facility] - Clinic Note    Subjective      Mr. Bales is a 52 y.o. male who presents to clinic for Follow-up (3mth follow up/refills)      With regard to patient's degenerative disc disease it is stable. The Soma is helping.  He takes it when he needs it. Blood pressure is well controlled.  The Klonopin is helping with his anxiety.      PMH Vincent has a past medical history of DDD (degenerative disc disease), thoracolumbar, Depression, GERD (gastroesophageal reflux disease), Hyperlipidemia, and Hypertension.   PSXH Vincent has a past surgical history that includes Colonoscopy; Laparoscopic cholecystectomy (Bilateral, 4/4/2023); and repair, hernia, umbilical (N/A, 4/4/2023).    Vincent's family history includes Diabetes in his maternal grandmother; Hypertension in his mother.   SH Vincent reports that he has never smoked. He has never used smokeless tobacco. He reports that he does not drink alcohol and does not use drugs.   ALG Vincent is allergic to haloperidol.   ALBERTINA Kaur has a current medication list which includes the following prescription(s): amlodipine, benzonatate, carisoprodol, citalopram, clonazepam, ezetimibe, fluticasone propionate, isosorbide mononitrate, metoprolol succinate, nabumetone, pantoprazole, rosuvastatin, and zyprexa, and the following Facility-Administered Medications: ceFOXItin (MEFOXIN) 2 g in dextrose 5 % (D5W) 50 mL IVPB, diphenhydramine, lactated ringers, lactated ringers, lidocaine (pf) 10 mg/ml (1%), and ondansetron.     Review of Systems   Constitutional:  Negative for chills and fever.   HENT:  Negative for congestion, dental problem and rhinorrhea.    Eyes:  Negative for visual disturbance.   Respiratory:  Negative for cough and shortness of breath.    Cardiovascular:  Negative for chest pain.   Gastrointestinal:  Negative for abdominal pain, constipation, diarrhea, nausea and vomiting.   Genitourinary:  Negative for dysuria.   Musculoskeletal:  Negative for myalgias.   Skin:  Negative  "for rash.   Neurological:  Negative for weakness and headaches.     Objective     /80 (BP Location: Left arm, Patient Position: Sitting, BP Method: Large (Manual))   Pulse 78   Temp 98.5 °F (36.9 °C) (Temporal)   Resp 18   Ht 6' 1" (1.854 m)   Wt 111.9 kg (246 lb 9.6 oz)   SpO2 96%   BMI 32.53 kg/m²     Physical Exam  Vitals and nursing note reviewed.   Constitutional:       General: He is not in acute distress.     Appearance: Normal appearance. He is well-developed. He is not diaphoretic.   HENT:      Head: Normocephalic and atraumatic.      Right Ear: External ear normal.      Left Ear: External ear normal.   Eyes:      General:         Right eye: No discharge.         Left eye: No discharge.   Cardiovascular:      Rate and Rhythm: Normal rate and regular rhythm.      Heart sounds: Normal heart sounds.   Pulmonary:      Effort: Pulmonary effort is normal.      Breath sounds: Normal breath sounds. No wheezing or rales.   Skin:     General: Skin is warm and dry.   Neurological:      Mental Status: He is alert and oriented to person, place, and time. Mental status is at baseline.   Psychiatric:         Mood and Affect: Mood normal.         Behavior: Behavior normal.         Thought Content: Thought content normal.         Judgment: Judgment normal.        Assessment/Plan     1. Annual physical exam        2. DDD (degenerative disc disease), thoracolumbar  Drug screen panel, in-house    nabumetone (RELAFEN) 750 MG tablet    Ambulatory referral/consult to Pain Clinic    carisoprodoL (SOMA) 350 MG tablet      3. NAFLD (nonalcoholic fatty liver disease)  Comprehensive Metabolic Panel    CBC Auto Differential    Hemoglobin A1C    Lipid panel        Continue medication.   reviewed.  No red flags.  Increase nabumetone to 750 mg b.i.d. as needed.  Urine drug screen today.  Due for labs as above.    Future Appointments   Date Time Provider Department Center   11/2/2023  3:20 PM Kin Domínguez MD [facility] " DORA Domínguez MD  Family Medicine  [facility]

## 2023-08-16 ENCOUNTER — TELEPHONE (OUTPATIENT)
Dept: FAMILY MEDICINE | Facility: CLINIC | Age: 53
End: 2023-08-16
Payer: MEDICAID

## 2023-08-16 RX ORDER — AZITHROMYCIN 250 MG/1
TABLET, FILM COATED ORAL
Qty: 6 TABLET | Refills: 0 | Status: SHIPPED | OUTPATIENT
Start: 2023-08-16 | End: 2023-08-21

## 2023-08-16 NOTE — TELEPHONE ENCOUNTER
----- Message from Savannah Chen sent at 8/16/2023  1:41 PM CDT -----  Type:  Needs Medical Advice    Who Called: pt   Best Call Back Number: [phone] (home)     Additional Information:  Requesting call back a rx be sent to the pharmacy a [facility], [place] - [address]  [address]  [address] [address]  Phone: [phone] Fax: [phone]     please advise thank you

## 2023-08-16 NOTE — TELEPHONE ENCOUNTER
----- Message from Harriett River sent at 8/16/2023  8:30 AM CDT -----  Contact: Heidy [phone]  Type: Needs Medical Advice  Who Called:  Heidy england/ [facility]    Best Call Back Number: [phone]  fax [phone]  Additional Information: Heidy received referral but needs office notes faxed to her.

## 2023-08-22 ENCOUNTER — TELEPHONE (OUTPATIENT)
Dept: FAMILY MEDICINE | Facility: CLINIC | Age: 53
End: 2023-08-22
Payer: MEDICAID

## 2023-08-22 NOTE — TELEPHONE ENCOUNTER
----- Message from Dhara Cosby sent at 8/22/2023  2:47 PM CDT -----  Contact: PT  Type: Needs Medical Advice    Who Called: PT  Best Call Back Number: [phone]  Additional  Information: PT requesting to  referral for REF64 - AMB REFERRAL/CONSULT TO PAIN CLINIC, sent to Dr. Chay Mckeon. FAX# [phone]  Please Advise- Thank you

## 2023-08-22 NOTE — TELEPHONE ENCOUNTER
----- Message from Dhara Cosby sent at 8/22/2023  2:42 PM CDT -----  Contact: PT  Type: Needs Medical Advice    Who Called: PT  Best Call Back Number: [phone]  Additional  Information: PT requesting to get order for COMPLETE PFT WITH BRONCHODILATOR, faxed to [place], [place] FAX# [phone].  Please Advise- Thank you

## 2023-08-28 ENCOUNTER — TELEPHONE (OUTPATIENT)
Dept: FAMILY MEDICINE | Facility: CLINIC | Age: 53
End: 2023-08-28
Payer: MEDICAID

## 2023-08-28 NOTE — TELEPHONE ENCOUNTER
----- Message from Tavares Mathis sent at 8/28/2023 12:11 PM CDT -----  Regarding: Body aches .....  Type:  Needs Medical Advice    Who Called: Pt    Symptoms (please be specific): Body aches/ was in heat for 2 days/  bones hurt / tylenol didn't help also has the runs    How long has patient had these symptoms:  2 days     Pharmacy name and phone #:    [facility] - [place], [place] - [address]  [address]  [facility] [address]  [facility] [address]  Phone: [phone] Fax: [phone]      Would the patient rather a call back or a response via [facility]? Call back    Best Call Back Number: [phone]      Additional Information: wants to know if he can get something call in.  Please advise -- Thank you

## 2023-08-28 NOTE — TELEPHONE ENCOUNTER
"Pt states, " I was working on in this shed, it was really hot, no air flow. I now have body aches, chills, the runs." Pt advised to report to UC for further evaluation. Pt voiced understanding.   "

## 2023-08-30 ENCOUNTER — TELEPHONE (OUTPATIENT)
Dept: FAMILY MEDICINE | Facility: CLINIC | Age: 53
End: 2023-08-30
Payer: MEDICAID

## 2023-08-30 NOTE — TELEPHONE ENCOUNTER
----- Message from Lexie Dacosta sent at 8/30/2023 10:44 AM CDT -----  Contact: Patient  Type:  Same Day Appointment Request    Caller is requesting a same day appointment.  Caller declined first available appointment listed below.      Name of Caller:Patient    When is the first available appointment?Oct    Symptoms:Body aches, headache, coughing up pink mucus/ sinus pressure    Best Call Back Number:[phone] (home)     Additional Information: Please call to advise

## 2023-08-30 NOTE — TELEPHONE ENCOUNTER
Pt reports URI,Body aches, headache, coughing up pink mucus/ sinus pressure.  Pt requesting same day appointment. No appt available. Pt advised to report to urgent care. Pt voiced understanding.

## 2023-09-07 DIAGNOSIS — M51.35 DDD (DEGENERATIVE DISC DISEASE), THORACOLUMBAR: ICD-10-CM

## 2023-09-07 DIAGNOSIS — F41.9 ANXIETY: ICD-10-CM

## 2023-09-07 RX ORDER — CARISOPRODOL 350 MG/1
350 TABLET ORAL 2 TIMES DAILY PRN
Qty: 60 TABLET | Refills: 0 | OUTPATIENT
Start: 2023-09-07

## 2023-09-07 NOTE — TELEPHONE ENCOUNTER
----- Message from Faraz Robertson sent at 9/7/2023  1:25 PM CDT -----  Type:  RX Refill Request    Who Called:  Patient  Refill or New Rx:  Refill  RX Name and Strength:    carisoprodoL (SOMA) 350 MG tablet  1 tablet 2XDay, 90 days    clonazePAM (KLONOPIN) 1 MG tablet  1 tablet 2XDay, 90 days    Preferred Pharmacy with phone number:    [facility] [address] [place], [place] [address] [address] [address]  Phone: [phone] Fax: [phone]      Local or Mail Order:  Local  Ordering Provider:  Catrachita  Best Call Back Number: [phone]   Additional Information:

## 2023-09-08 RX ORDER — BENZONATATE 100 MG/1
200 CAPSULE ORAL 3 TIMES DAILY PRN
Qty: 30 CAPSULE | Refills: 0 | Status: SHIPPED | OUTPATIENT
Start: 2023-09-08 | End: 2023-10-05 | Stop reason: SDUPTHER

## 2023-09-08 NOTE — TELEPHONE ENCOUNTER
----- Message from Simran Thompson sent at 9/7/2023  5:29 PM CDT -----  Type:  RX Refill Request    Who Called: pt  Refill or New Rx:refill  RX Name and Strength:clonazePAM (KLONOPIN) 1 MG tablet, carisoprodoL (SOMA) 350 MG tablet  Preferred Pharmacy with phone number:[facility], [place] - [address]  Local or Mail Order:local  Ordering Provider:tasia  Would the patient rather a call back or a response via [facility]? call  Best Call Back Number:[phone]  Additional Information: pt third time calling

## 2023-09-11 ENCOUNTER — PATIENT MESSAGE (OUTPATIENT)
Dept: FAMILY MEDICINE | Facility: CLINIC | Age: 53
End: 2023-09-11
Payer: MEDICAID

## 2023-09-12 ENCOUNTER — PATIENT MESSAGE (OUTPATIENT)
Dept: FAMILY MEDICINE | Facility: CLINIC | Age: 53
End: 2023-09-12
Payer: MEDICAID

## 2023-09-12 RX ORDER — CLONAZEPAM 1 MG/1
1 TABLET ORAL 2 TIMES DAILY
Qty: 60 TABLET | Refills: 0 | OUTPATIENT
Start: 2023-09-12

## 2023-09-12 RX ORDER — CARISOPRODOL 350 MG/1
350 TABLET ORAL 2 TIMES DAILY PRN
Qty: 60 TABLET | Refills: 0 | Status: SHIPPED | OUTPATIENT
Start: 2023-09-12 | End: 2023-10-05 | Stop reason: SDUPTHER

## 2023-09-19 ENCOUNTER — TELEPHONE (OUTPATIENT)
Dept: FAMILY MEDICINE | Facility: CLINIC | Age: 53
End: 2023-09-19
Payer: MEDICAID

## 2023-09-19 NOTE — TELEPHONE ENCOUNTER
----- Message from Annita Peraza sent at 9/19/2023 12:08 PM CDT -----  Contact: Patient  Type: Needs Medical Advice    Who Called:  Patient  What is this regarding?:  Pt is calling for his blood lab results.  Best Call Back Number:  [phone]  Additional Information:  Please call the patient back at the phone number listed above to advise. Thank you!

## 2023-09-19 NOTE — TELEPHONE ENCOUNTER
Attempted to contact Vincent Bales to discuss results.    Left voice mail to return our call at [phone] on [phone] (home).    Alia Cheung LPN

## 2023-09-20 ENCOUNTER — TELEPHONE (OUTPATIENT)
Dept: FAMILY MEDICINE | Facility: CLINIC | Age: 53
End: 2023-09-20
Payer: MEDICAID

## 2023-09-20 NOTE — TELEPHONE ENCOUNTER
----- Message from Mariam Borden sent at 9/20/2023 10:58 AM CDT -----  Contact: self  Type:  Needs Medical Advice    Who Called: Pt  Symptoms (please be specific):  Pt is requesting his lab results be faxed over to :  Attn: Shakira or Truong @ [facility], fax: [phone]     Would the patient rather a call back or a response via [facility]? Call   Best Call Back Number: [phone]    Please call pt to advise/consult... Thank you...

## 2023-10-05 DIAGNOSIS — F41.9 ANXIETY: ICD-10-CM

## 2023-10-05 DIAGNOSIS — M51.35 DDD (DEGENERATIVE DISC DISEASE), THORACOLUMBAR: ICD-10-CM

## 2023-10-05 NOTE — TELEPHONE ENCOUNTER
----- Message from Vicki Lambert sent at 10/5/2023 10:12 AM CDT -----  Regarding: RX Refill Request  Contact: CELIA JUAN [phone]  Type:  RX Refill Request        Who Called: CELIA JUAN    Refill or New Rx:  REFILL    RX Name and Strength:    1 carisoprodoL (SOMA) 350 MG tablet  2 clonazePAM (KLONOPIN) 1 MG tablet  3 benzonatate (TESSALON) 100 MG capsule    How is the patient currently taking it? (ex. 1XDay):  1X DAY     Is this a 30 day or 90 day RX:  30 DAY     Preferred Pharmacy with phone number:    [facility], [place] - [address]  [address]  [facility] 1  [place] [address]  Phone: [phone] Fax: [phone]      Local or Mail Order:  LOCAL    Ordering Provider:  GEORGETTE Valdivia Call Back Number:  [phone] (home)       Additional Information:  Patient is calling to request Rx refill on   1 carisoprodoL (SOMA) 350 MG tablet  2 clonazePAM (KLONOPIN) 1 MG tablet  3 benzonatate (TESSALON) 100 MG capsule  Please call back and advise. Thanks

## 2023-10-07 RX ORDER — CARISOPRODOL 350 MG/1
350 TABLET ORAL 2 TIMES DAILY PRN
Qty: 60 TABLET | Refills: 0 | Status: SHIPPED | OUTPATIENT
Start: 2023-10-07

## 2023-10-07 RX ORDER — BENZONATATE 100 MG/1
200 CAPSULE ORAL 3 TIMES DAILY PRN
Qty: 30 CAPSULE | Refills: 0 | Status: SHIPPED | OUTPATIENT
Start: 2023-10-07

## 2023-10-07 RX ORDER — CLONAZEPAM 1 MG/1
1 TABLET ORAL 2 TIMES DAILY
Qty: 60 TABLET | Refills: 0 | OUTPATIENT
Start: 2023-10-07

## 2023-11-02 DIAGNOSIS — M51.35 DDD (DEGENERATIVE DISC DISEASE), THORACOLUMBAR: ICD-10-CM

## 2023-11-02 RX ORDER — CARISOPRODOL 350 MG/1
350 TABLET ORAL 2 TIMES DAILY PRN
Qty: 60 TABLET | Refills: 0 | OUTPATIENT
Start: 2023-11-02

## 2023-11-06 DIAGNOSIS — M51.35 DDD (DEGENERATIVE DISC DISEASE), THORACOLUMBAR: ICD-10-CM

## 2023-11-06 RX ORDER — CARISOPRODOL 350 MG/1
350 TABLET ORAL 2 TIMES DAILY PRN
Qty: 60 TABLET | Refills: 0 | OUTPATIENT
Start: 2023-11-06

## 2023-11-14 ENCOUNTER — OFFICE VISIT (OUTPATIENT)
Dept: FAMILY MEDICINE | Facility: CLINIC | Age: 53
End: 2023-11-14
Payer: MEDICAID

## 2023-11-14 VITALS
WEIGHT: 253.19 LBS | HEART RATE: 70 BPM | SYSTOLIC BLOOD PRESSURE: 114 MMHG | BODY MASS INDEX: 33.55 KG/M2 | DIASTOLIC BLOOD PRESSURE: 82 MMHG | HEIGHT: 73 IN | RESPIRATION RATE: 20 BRPM | OXYGEN SATURATION: 98 %

## 2023-11-14 DIAGNOSIS — M51.35 DDD (DEGENERATIVE DISC DISEASE), THORACOLUMBAR: ICD-10-CM

## 2023-11-14 PROCEDURE — 3079F DIAST BP 80-89 MM HG: CPT | Mod: CPTII,,, | Performed by: FAMILY MEDICINE

## 2023-11-14 PROCEDURE — 3074F PR MOST RECENT SYSTOLIC BLOOD PRESSURE < 130 MM HG: ICD-10-PCS | Mod: CPTII,,, | Performed by: FAMILY MEDICINE

## 2023-11-14 PROCEDURE — 3074F SYST BP LT 130 MM HG: CPT | Mod: CPTII,,, | Performed by: FAMILY MEDICINE

## 2023-11-14 PROCEDURE — 3008F PR BODY MASS INDEX (BMI) DOCUMENTED: ICD-10-PCS | Mod: CPTII,,, | Performed by: FAMILY MEDICINE

## 2023-11-14 PROCEDURE — 99999 PR PBB SHADOW E&M-EST. PATIENT-LVL IV: CPT | Mod: PBBFAC,,, | Performed by: FAMILY MEDICINE

## 2023-11-14 PROCEDURE — 99213 OFFICE O/P EST LOW 20 MIN: CPT | Mod: S$PBB,,, | Performed by: FAMILY MEDICINE

## 2023-11-14 PROCEDURE — 99214 OFFICE O/P EST MOD 30 MIN: CPT | Mod: PBBFAC | Performed by: FAMILY MEDICINE

## 2023-11-14 PROCEDURE — 3044F PR MOST RECENT HEMOGLOBIN A1C LEVEL <7.0%: ICD-10-PCS | Mod: CPTII,,, | Performed by: FAMILY MEDICINE

## 2023-11-14 PROCEDURE — 1159F PR MEDICATION LIST DOCUMENTED IN MEDICAL RECORD: ICD-10-PCS | Mod: CPTII,,, | Performed by: FAMILY MEDICINE

## 2023-11-14 PROCEDURE — 3044F HG A1C LEVEL LT 7.0%: CPT | Mod: CPTII,,, | Performed by: FAMILY MEDICINE

## 2023-11-14 PROCEDURE — 1159F MED LIST DOCD IN RCRD: CPT | Mod: CPTII,,, | Performed by: FAMILY MEDICINE

## 2023-11-14 PROCEDURE — 99213 PR OFFICE/OUTPT VISIT, EST, LEVL III, 20-29 MIN: ICD-10-PCS | Mod: S$PBB,,, | Performed by: FAMILY MEDICINE

## 2023-11-14 PROCEDURE — 3008F BODY MASS INDEX DOCD: CPT | Mod: CPTII,,, | Performed by: FAMILY MEDICINE

## 2023-11-14 PROCEDURE — 3079F PR MOST RECENT DIASTOLIC BLOOD PRESSURE 80-89 MM HG: ICD-10-PCS | Mod: CPTII,,, | Performed by: FAMILY MEDICINE

## 2023-11-14 PROCEDURE — 99999 PR PBB SHADOW E&M-EST. PATIENT-LVL IV: ICD-10-PCS | Mod: PBBFAC,,, | Performed by: FAMILY MEDICINE

## 2023-11-14 RX ORDER — CARISOPRODOL 350 MG/1
350 TABLET ORAL 2 TIMES DAILY PRN
Qty: 60 TABLET | Refills: 0 | Status: CANCELLED | OUTPATIENT
Start: 2023-11-14

## 2023-11-14 NOTE — PROGRESS NOTES
"Subjective:       Patient ID: Vincent Bales is a 52 y.o. male.    Chief Complaint: Establish Care    Mr. Vincent Bales is a 52-year-old male with a history of degenerative disc disease, anxiety, hypertension, nonalcoholic fatty liver disease and gallstones.  He is here today wanting refills on his Soma.  He is on Soma and Xanax for his muscle spasms.  He states he can not take anything else due to his fatty liver disease.  I told Mr. Bales that I do not write long term Soma nor Xanax,  as it is not indicated for long-term use due to its addictive potential.  He started to raise his voice and yell at me,  stating, " if you are not going to give me my soma , You're not any good to me."  He stated that if he knew  I was not going to refill his Soma he would never have wasted his time coming down here, and he just wasted his time.  I did not raise my voice at all during his office visit, and I told Mr. Bales," Sir, I am not going to raise my voice, and there are other alternatives to Soma that work well on muscle spasms such as tizanidine and cyclobenzaprine." I told him that I would be happy to prescribe muscle relaxers as I understand he has back pain, but Soma is a controlled substance and it has long-term addictive potential.  He yelled at me again and stated "I am not an addict" and "Soma is the only medicine that worked" and he did not understand why I refused to write it because he has been on it for years and "nobody else has a problem writing my soma". He states that he has an extremely bad back, and a fatty liver, so, he can't take any of the other medications I suggested  He again said that there was no need for him to be here if I was not going to give him his medicine.  I  Said yes Sir, that is your prerogative, and "I understand."  As we got up to check Mr. Bales out at the  he continued to raise his voice and yell at me, and then he stood at the  for over 5 minutes raising " "his voice  at Ms Griffin,  the MA, and Aicha, nurse that were sitting at the . He finally left after Ms Griffin repeatedly told him that there was nothing she could do about his Soma, as she cannot write prescription medications.      Review of Systems   Musculoskeletal:  Positive for arthralgias, back pain and myalgias.       Patient Active Problem List   Diagnosis    DDD (degenerative disc disease), thoracolumbar    Anxiety    Hypertension    NAFLD (nonalcoholic fatty liver disease)    Calculus of gallbladder without cholecystitis without obstruction       Objective:      Physical Exam  Constitutional:       Appearance: Normal appearance.   Eyes:      Pupils: Pupils are equal, round, and reactive to light.   Musculoskeletal:      Comments: Ambulates well   Neurological:      General: No focal deficit present.      Mental Status: He is alert and oriented to person, place, and time.   Psychiatric:      Comments: Agitated and angry because I stated I would not write his Soma         Lab Results   Component Value Date    WBC 5.69 08/01/2023    HGB 14.2 08/01/2023    HCT 42.2 08/01/2023     08/01/2023    CHOL 209 (H) 08/01/2023    TRIG 123 08/01/2023    HDL 62 08/01/2023    ALT 34 08/01/2023    AST 32 08/01/2023     08/01/2023    K 4.0 08/01/2023     08/01/2023    CREATININE 0.9 08/01/2023    BUN 9 08/01/2023    CO2 23 08/01/2023    TSH 0.916 12/06/2022    HGBA1C 5.8 (H) 08/01/2023     The 10-year ASCVD risk score (Yuri CARMICHAEL, et al., 2019) is: 8.1%    Values used to calculate the score:      Age: 52 years      Sex: Male      Is Non- : Yes      Diabetic: No      Tobacco smoker: No      Systolic Blood Pressure: 114 mmHg      Is BP treated: Yes      HDL Cholesterol: 62 mg/dL      Total Cholesterol: 209 mg/dL  Visit Vitals  /82 (BP Location: Right arm, Patient Position: Sitting, BP Method: Large (Manual))   Pulse 70   Resp 20   Ht 6' 1" (1.854 m)   Wt 114.9 kg (253 lb " 3.2 oz)   SpO2 98%   BMI 33.41 kg/m²      Assessment:       1. DDD (degenerative disc disease), thoracolumbar        Plan:       1. DDD (degenerative disc disease), thoracolumbar     Mr. Bales left the facility angry because I would not write Soma long-term.  He states he has been on some were for years, and he was not interested in changing to an alternative muscle relaxer.  No follow-ups on file.

## 2023-11-27 ENCOUNTER — TELEPHONE (OUTPATIENT)
Dept: FAMILY MEDICINE | Facility: CLINIC | Age: 53
End: 2023-11-27
Payer: MEDICAID

## 2023-11-27 NOTE — TELEPHONE ENCOUNTER
----- Message from Briana Cosby sent at 11/27/2023  1:00 PM CST -----  Regarding: A1C level  Contact: pt  Type:  Needs Medical Advice    Who Called: pt    Would the patient rather a call back or a response via [facility]? Call back  Best Call Back Number: [phone]    Additional Information: sts he would like to know his A1C level--please advise

## 2023-12-20 ENCOUNTER — OFFICE VISIT (OUTPATIENT)
Dept: FAMILY MEDICINE | Facility: CLINIC | Age: 53
End: 2023-12-20
Payer: MEDICAID

## 2023-12-20 VITALS
WEIGHT: 250.19 LBS | SYSTOLIC BLOOD PRESSURE: 118 MMHG | RESPIRATION RATE: 18 BRPM | HEART RATE: 74 BPM | OXYGEN SATURATION: 96 % | BODY MASS INDEX: 33.16 KG/M2 | HEIGHT: 73 IN | DIASTOLIC BLOOD PRESSURE: 82 MMHG

## 2023-12-20 DIAGNOSIS — R73.03 PREDIABETES: Primary | ICD-10-CM

## 2023-12-20 DIAGNOSIS — M51.35 DDD (DEGENERATIVE DISC DISEASE), THORACOLUMBAR: ICD-10-CM

## 2023-12-20 PROCEDURE — 1159F MED LIST DOCD IN RCRD: CPT | Mod: CPTII,,, | Performed by: STUDENT IN AN ORGANIZED HEALTH CARE EDUCATION/TRAINING PROGRAM

## 2023-12-20 PROCEDURE — 3008F PR BODY MASS INDEX (BMI) DOCUMENTED: ICD-10-PCS | Mod: CPTII,,, | Performed by: STUDENT IN AN ORGANIZED HEALTH CARE EDUCATION/TRAINING PROGRAM

## 2023-12-20 PROCEDURE — 99999 PR PBB SHADOW E&M-EST. PATIENT-LVL IV: ICD-10-PCS | Mod: PBBFAC,,, | Performed by: STUDENT IN AN ORGANIZED HEALTH CARE EDUCATION/TRAINING PROGRAM

## 2023-12-20 PROCEDURE — 1159F PR MEDICATION LIST DOCUMENTED IN MEDICAL RECORD: ICD-10-PCS | Mod: CPTII,,, | Performed by: STUDENT IN AN ORGANIZED HEALTH CARE EDUCATION/TRAINING PROGRAM

## 2023-12-20 PROCEDURE — 99214 OFFICE O/P EST MOD 30 MIN: CPT | Mod: PBBFAC | Performed by: STUDENT IN AN ORGANIZED HEALTH CARE EDUCATION/TRAINING PROGRAM

## 2023-12-20 PROCEDURE — 3079F PR MOST RECENT DIASTOLIC BLOOD PRESSURE 80-89 MM HG: ICD-10-PCS | Mod: CPTII,,, | Performed by: STUDENT IN AN ORGANIZED HEALTH CARE EDUCATION/TRAINING PROGRAM

## 2023-12-20 PROCEDURE — 3044F HG A1C LEVEL LT 7.0%: CPT | Mod: CPTII,,, | Performed by: STUDENT IN AN ORGANIZED HEALTH CARE EDUCATION/TRAINING PROGRAM

## 2023-12-20 PROCEDURE — 1160F PR REVIEW ALL MEDS BY PRESCRIBER/CLIN PHARMACIST DOCUMENTED: ICD-10-PCS | Mod: CPTII,,, | Performed by: STUDENT IN AN ORGANIZED HEALTH CARE EDUCATION/TRAINING PROGRAM

## 2023-12-20 PROCEDURE — 3079F DIAST BP 80-89 MM HG: CPT | Mod: CPTII,,, | Performed by: STUDENT IN AN ORGANIZED HEALTH CARE EDUCATION/TRAINING PROGRAM

## 2023-12-20 PROCEDURE — 99214 PR OFFICE/OUTPT VISIT, EST, LEVL IV, 30-39 MIN: ICD-10-PCS | Mod: S$PBB,,, | Performed by: STUDENT IN AN ORGANIZED HEALTH CARE EDUCATION/TRAINING PROGRAM

## 2023-12-20 PROCEDURE — 3074F SYST BP LT 130 MM HG: CPT | Mod: CPTII,,, | Performed by: STUDENT IN AN ORGANIZED HEALTH CARE EDUCATION/TRAINING PROGRAM

## 2023-12-20 PROCEDURE — 3008F BODY MASS INDEX DOCD: CPT | Mod: CPTII,,, | Performed by: STUDENT IN AN ORGANIZED HEALTH CARE EDUCATION/TRAINING PROGRAM

## 2023-12-20 PROCEDURE — 3044F PR MOST RECENT HEMOGLOBIN A1C LEVEL <7.0%: ICD-10-PCS | Mod: CPTII,,, | Performed by: STUDENT IN AN ORGANIZED HEALTH CARE EDUCATION/TRAINING PROGRAM

## 2023-12-20 PROCEDURE — 3074F PR MOST RECENT SYSTOLIC BLOOD PRESSURE < 130 MM HG: ICD-10-PCS | Mod: CPTII,,, | Performed by: STUDENT IN AN ORGANIZED HEALTH CARE EDUCATION/TRAINING PROGRAM

## 2023-12-20 PROCEDURE — 99214 OFFICE O/P EST MOD 30 MIN: CPT | Mod: S$PBB,,, | Performed by: STUDENT IN AN ORGANIZED HEALTH CARE EDUCATION/TRAINING PROGRAM

## 2023-12-20 PROCEDURE — 1160F RVW MEDS BY RX/DR IN RCRD: CPT | Mod: CPTII,,, | Performed by: STUDENT IN AN ORGANIZED HEALTH CARE EDUCATION/TRAINING PROGRAM

## 2023-12-20 PROCEDURE — 99999 PR PBB SHADOW E&M-EST. PATIENT-LVL IV: CPT | Mod: PBBFAC,,, | Performed by: STUDENT IN AN ORGANIZED HEALTH CARE EDUCATION/TRAINING PROGRAM

## 2023-12-20 NOTE — PROGRESS NOTES
[facility] Note    Subjective:    The HPI and pertinent ROS is included in the Diagnostic Impression Remarks section at the end of the note. Please see below for further details. Chief complaint is at end of note.     Vincent GOMEZ is a pleasant intelligent patient who is here for evaluation.     Modified Medications    No medications on file       Data reviewed 274}  Previous medical records reviewed and summarized in plan section at end of note.      If you are due for any health screening(s) below please notify me so we can arrange them to be ordered and scheduled. Most healthy patients at your age complete them, but you are free to accept or refuse. If you can't do it, I'll definitely understand. If you can, I'd certainly appreciate it!     All of your core healthy metrics are met.      The following portions of the patient's history were reviewed and updated as appropriate: allergies, current medications, past family history, past medical history, past social history, past surgical history and problem list.    He  has a past medical history of DDD (degenerative disc disease), thoracolumbar, Depression, GERD (gastroesophageal reflux disease), Hyperlipidemia, and Hypertension.  He  has a past surgical history that includes Colonoscopy; Laparoscopic cholecystectomy (Bilateral, 4/4/2023); and repair, hernia, umbilical (N/A, 4/4/2023).    He  reports that he has never smoked. He has never used smokeless tobacco. He reports that he does not drink alcohol and does not use drugs.  He family history includes Diabetes in his maternal grandmother; Hypertension in his mother.    Review of patient's allergies indicates:   Allergen Reactions    Haloperidol      Other reaction(s): Other (See Comments)  CRAZY       Tobacco Use: Low Risk  (12/20/2023)    Patient History     Smoking Tobacco Use: Never     Smokeless Tobacco Use: Never     Passive Exposure: Not on file     Physical Examination  General appearance: alert,  "cooperative, no distress  Neck: no thyromegaly, no neck stiffness  Lungs: clear to auscultation, no wheezes, rales or rhonchi, symmetric air entry  Heart: normal rate, regular rhythm, normal S1, S2, no murmurs, rubs, clicks or gallops  Abdomen: soft, nontender, nondistended, no rigidity, rebound, or guarding.   Back: no point tenderness over spine  Extremities: peripheral pulses normal, no unilateral leg swelling or calf tenderness   Neurological:alert, oriented, normal speech, no new focal findings or movement disorder noted from baseline    BP Readings from Last 3 Encounters:   12/20/23 118/82   11/14/23 114/82   08/01/23 126/80     Wt Readings from Last 3 Encounters:   12/20/23 113.5 kg (250 lb 3.2 oz)   11/14/23 114.9 kg (253 lb 3.2 oz)   08/01/23 111.9 kg (246 lb 9.6 oz)     /82 (BP Location: Left arm, Patient Position: Sitting, BP Method: Large (Manual))   Pulse 74   Resp 18   Ht 6' 1" (1.854 m)   Wt 113.5 kg (250 lb 3.2 oz)   SpO2 96%   BMI 33.01 kg/m²    274}  Laboratory: I have reviewed old labs below:    274}    Lab Results   Component Value Date    WBC 5.69 08/01/2023    HGB 14.2 08/01/2023    HCT 42.2 08/01/2023    MCV 93 08/01/2023     08/01/2023     08/01/2023    K 4.0 08/01/2023     08/01/2023    CALCIUM 9.5 08/01/2023    CO2 23 08/01/2023     08/01/2023    BUN 9 08/01/2023    CREATININE 0.9 08/01/2023    EGFRNORACEVR >60.0 08/01/2023    ANIONGAP 10 08/01/2023    PROT 7.6 08/01/2023    ALBUMIN 4.0 08/01/2023    BILITOT 0.4 08/01/2023    ALKPHOS 68 08/01/2023    ALT 34 08/01/2023    AST 32 08/01/2023    CHOL 209 (H) 08/01/2023    TRIG 123 08/01/2023    HDL 62 08/01/2023    LDLCALC 122.4 08/01/2023    TSH 0.916 12/06/2022    HGBA1C 5.8 (H) 08/01/2023      Lab reviewed by me: Particular labs of significance that I will monitor, workup, or treat to improve are mentioned below in diagnostic impression remarks.    Imaging/EKG: I have reviewed the pertinent results and " "my findings are noted in remarks.  274}    CC:   Chief Complaint   Patient presents with    Establish Care     Back pain, front and back of knee. Stomach still having pain since surgery         274}    Assessment/Plan  Vincent Bales is a 53 y.o. male who presents to clinic with:  1. Prediabetes    2. DDD (degenerative disc disease), thoracolumbar       274}  Diagnostic Impression Remarks + HPI     Documentation entered by me for this encounter may have been done in part using speech-recognition technology. Although I have made an effort to ensure accuracy, "sound like" errors may exist and should be interpreted in context.     DDD: patient requests his soma. Patient already has a referral on file to follow with Dr. Mckeon. Advised patient start seeing pain management. Per , it appears that an outside family medicine NP provider has filled his medication on 12/6.   Prediabetes: found earlier this year. Has been doing lifestyle modifications. Would like to recheck his A1c. Order placed.    This is the extent of this pleasant patient's concerns at this present time. He did not feel chest pain upon exertion, dyspnea, nausea, vomiting, diaphoresis, or syncope. No pleuritic chest pain, unilateral leg swelling, calf tenderness, or calf pain. Negative for unintentional weight loss night sweats, hematuria, and fevers. Vincent will return to clinic in a few months for further workup and reassessment or sooner as needed. He was instructed to call the clinic or go to the emergency department or urgent care immediately if his symptoms do not improve, worsens, or if any new symptoms develop. As we discussed that symptoms could worsen over the next 24 hours he was advised that if any increased swelling, pain, or numbness arise to go immediately to the ED. Patient knows to call any time if an emergency arises. Shared decision making occurred and he verbalized understanding in agreement with this plan. I discussed imaging findings, " diagnosis, possibilities, treatment options, medications, risks, and benefits. He had many questions regarding the options and long-term effects. All questions were answered. He expressed understanding after counseling regarding the diagnosis and recommendations. He was capable and demonstrated competence with understanding of these options. Shared decision making was performed resulting in him choosing the current treatment plan. Patient handout was given with instructions and recommendations. Advised the patient that if they become pregnant to alert us immediately to assess for medication changes. Having a healthy weight can decrease the risk of 13 cancers and is an important goal. I also discussed the importance of close follow up to discuss labs, change or modify his medications if needed, monitor side effects, and further evaluation of medical problems.     Additional workup planned: see labs ordered below.    See below for labs and meds ordered with associated diagnosis      1. Prediabetes  - Hemoglobin A1C; Future    2. DDD (degenerative disc disease), thoracolumbar      Monroe Trammell MD   274}    If you are due for any health screening(s) below please notify me so we can arrange them to be ordered and scheduled. Most healthy patients at your age complete them, but you are free to accept or refuse.     If you can't do it, I'll definitely understand. If you can, I'd certainly appreciate it!   All of your core healthy metrics are met.

## 2023-12-22 ENCOUNTER — TELEPHONE (OUTPATIENT)
Dept: FAMILY MEDICINE | Facility: CLINIC | Age: 53
End: 2023-12-22
Payer: MEDICAID

## 2023-12-22 NOTE — TELEPHONE ENCOUNTER
Spoke with pt. Pt reports pain with picking up objects. Informed pt MD is out of the office. Advised to self treat with OTC medication. Advised pt if pain does not subside to report to the ER.

## 2023-12-22 NOTE — TELEPHONE ENCOUNTER
----- Message from Alia Cheung LPN sent at 12/21/2023  4:35 PM CST -----  Contact: PT    ----- Message -----  From: Kelsea Beasley  Sent: 12/21/2023   4:35 PM CST  To: Valeri Childress Staff    Type:  Needs Medical Advice    Who Called: PT   Symptoms (please be specific): PT STATES THAT HE'S HAS  CHRONIC PAIN STOMACH AND LOWER BACK PAIN DUE TO PICKING UP ON HEAVY OBJECTS   How long has patient had these symptoms: A FEW DAYS   Pharmacy name and phone #:    [facility], [place] - [address]  [address]  [address]  [facility] [address]  Phone: [phone] Fax: [phone]  Would the patient rather a call back or a response via [facility]? CALL   Best Call Back Number: [phone] (home)   Additional Information: THANK YOU

## 2023-12-27 ENCOUNTER — LAB VISIT (OUTPATIENT)
Dept: LAB | Facility: HOSPITAL | Age: 53
End: 2023-12-27
Attending: STUDENT IN AN ORGANIZED HEALTH CARE EDUCATION/TRAINING PROGRAM
Payer: MEDICAID

## 2023-12-27 DIAGNOSIS — R73.03 PREDIABETES: ICD-10-CM

## 2023-12-27 LAB
ESTIMATED AVG GLUCOSE: 120 MG/DL (ref 68–131)
HBA1C MFR BLD: 5.8 % (ref 4–5.6)

## 2023-12-27 PROCEDURE — 36415 COLL VENOUS BLD VENIPUNCTURE: CPT | Performed by: STUDENT IN AN ORGANIZED HEALTH CARE EDUCATION/TRAINING PROGRAM

## 2023-12-27 PROCEDURE — 83036 HEMOGLOBIN GLYCOSYLATED A1C: CPT | Performed by: STUDENT IN AN ORGANIZED HEALTH CARE EDUCATION/TRAINING PROGRAM

## 2024-01-02 ENCOUNTER — NURSE TRIAGE (OUTPATIENT)
Dept: ADMINISTRATIVE | Facility: CLINIC | Age: 54
End: 2024-01-02
Payer: MEDICAID

## 2024-01-02 NOTE — TELEPHONE ENCOUNTER
Pt with complaints of abd pain for 1 week to center abd and to the left.  Pt denies any vomiting or diarrhea, denies fever at this time.  States pain is severe, had 1 episode of blood streak when spitting up.  Care advice states to go to the ED now.  Patient verbally understands, all questions answered, advised to call back for any worsening symptoms or further needs.     Reason for Disposition   SEVERE abdominal pain (e.g., excruciating)    Additional Information   Negative: Passed out (i.e., fainted, collapsed and was not responding)   Negative: Shock suspected (e.g., cold/pale/clammy skin, too weak to stand, low BP, rapid pulse)   Negative: Sounds like a life-threatening emergency to the triager   Negative: Followed an abdomen (stomach) injury    Protocols used: Abdominal Pain - Male-A-[place]

## 2024-03-01 ENCOUNTER — TELEPHONE (OUTPATIENT)
Dept: SURGERY | Facility: CLINIC | Age: 54
End: 2024-03-01
Payer: MEDICAID

## 2024-03-01 NOTE — TELEPHONE ENCOUNTER
Writer spoke to pt, pt stated he has not been seen for his abdominal pain. Pt informed Dr. Janessa Aguirre is no longer with [facility]. Pt stated has an appt coming up with KALEIGH Mchugh at [facility] to discuss abdominal pain.       ----- Message from Shae Kebede sent at 12/21/2023  2:46 PM CST -----  Contact: self  Type:  Needs Medical Advice    Who Called: self  Symptoms (please be specific): pt is having stomach pain and would like  to send him prescription to the [facility]   Pharmacy name and phone #:   [facility] - [place], [place] - [address]  [address]  [address]  [place] [address]  Phone: [phone] Fax: [phone]        Would the patient rather a call back or a response via [facility]? call  Best Call Back Number: [phone] (home)     Additional Information: please advise and thank you.

## (undated) DEVICE — SPONGE LAP 18X18 PREWASHED

## (undated) DEVICE — BAG TISS RETRV MONARCH 10MM

## (undated) DEVICE — APPLIER CLIP ENDO LIGAMAX 5MM

## (undated) DEVICE — CANNULA LAP SEAL Z THRD 5X100

## (undated) DEVICE — GLOVE SURG ULTRA TOUCH 7

## (undated) DEVICE — GOWN POLY REINF BRTH SLV LG

## (undated) DEVICE — Device

## (undated) DEVICE — FILTER LAPAROSCOPIC SMOKE EVAC

## (undated) DEVICE — TUBING PNEUMO

## (undated) DEVICE — CANISTER SUCTION 3000CC

## (undated) DEVICE — TROCAR KII BLLN 12MM 10CM

## (undated) DEVICE — SUT CTD VICRYL 3-0 CR/SH

## (undated) DEVICE — UNDERGLOVE BIOGEL PI SZ 6.5 LF

## (undated) DEVICE — SUT ETHIBOND 0 CR/MO-7 8-18

## (undated) DEVICE — SOL IRR NACL .9% 3000ML

## (undated) DEVICE — SOL CLEARIFY VISUALIZATION LAP

## (undated) DEVICE — ELECTRODE LAP WIRE J-HOOK 36CM

## (undated) DEVICE — PAD SUREFIT GRND ELECTRD 10FT

## (undated) DEVICE — GLOVE SURGEONS ULTRA TOUCH 6.5

## (undated) DEVICE — BLADE EZ CLEAN 2.5IN MODIFIED

## (undated) DEVICE — IRRIGATOR SUCTION W/TIP

## (undated) DEVICE — SUT 0 VICRYL / UR6 (J603)

## (undated) DEVICE — ADHESIVE DERMABOND ADVANCED

## (undated) DEVICE — SUT MONOCRYL 4-0 PS-2

## (undated) DEVICE — DRAPE ABDOMINAL TIBURON 14X11

## (undated) DEVICE — SCISSOR 5MMX35CM DIRECT DRIVE

## (undated) DEVICE — UNDERGLOVES BIOGEL PI SIZE 7.5

## (undated) DEVICE — TROCAR ENDO Z THREAD KII 5X100

## (undated) DEVICE — SYR B-D REG DETCH 1INX21GA10ML